# Patient Record
Sex: MALE | Race: WHITE | NOT HISPANIC OR LATINO | Employment: FULL TIME | URBAN - METROPOLITAN AREA
[De-identification: names, ages, dates, MRNs, and addresses within clinical notes are randomized per-mention and may not be internally consistent; named-entity substitution may affect disease eponyms.]

---

## 2018-11-30 ENCOUNTER — HOSPITAL ENCOUNTER (EMERGENCY)
Facility: HOSPITAL | Age: 48
Discharge: HOME | End: 2018-12-01
Attending: EMERGENCY MEDICINE | Admitting: EMERGENCY MEDICINE

## 2018-11-30 DIAGNOSIS — R31.9 URINARY TRACT INFECTION WITH HEMATURIA, SITE UNSPECIFIED: ICD-10-CM

## 2018-11-30 DIAGNOSIS — N39.0 URINARY TRACT INFECTION WITH HEMATURIA, SITE UNSPECIFIED: ICD-10-CM

## 2018-11-30 DIAGNOSIS — N20.0 KIDNEY STONE: Primary | ICD-10-CM

## 2018-11-30 PROCEDURE — 99283 EMERGENCY DEPT VISIT LOW MDM: CPT

## 2018-11-30 PROCEDURE — 85025 COMPLETE CBC W/AUTO DIFF WBC: CPT | Performed by: EMERGENCY MEDICINE

## 2018-11-30 PROCEDURE — 81001 URINALYSIS AUTO W/SCOPE: CPT | Performed by: EMERGENCY MEDICINE

## 2018-11-30 PROCEDURE — 80053 COMPREHEN METABOLIC PANEL: CPT | Performed by: EMERGENCY MEDICINE

## 2018-11-30 RX ORDER — KETOROLAC TROMETHAMINE 30 MG/ML
30 INJECTION, SOLUTION INTRAMUSCULAR; INTRAVENOUS ONCE
Status: COMPLETED | OUTPATIENT
Start: 2018-11-30 | End: 2018-12-01

## 2018-11-30 RX ORDER — SODIUM CHLORIDE 0.9 % (FLUSH) 0.9 %
10 SYRINGE (ML) INJECTION AS NEEDED
Status: DISCONTINUED | OUTPATIENT
Start: 2018-11-30 | End: 2018-12-01 | Stop reason: HOSPADM

## 2018-11-30 RX ORDER — ONDANSETRON 2 MG/ML
4 INJECTION INTRAMUSCULAR; INTRAVENOUS ONCE
Status: COMPLETED | OUTPATIENT
Start: 2018-11-30 | End: 2018-12-01

## 2018-12-01 ENCOUNTER — APPOINTMENT (OUTPATIENT)
Dept: CT IMAGING | Facility: HOSPITAL | Age: 48
End: 2018-12-01

## 2018-12-01 VITALS
WEIGHT: 220.46 LBS | DIASTOLIC BLOOD PRESSURE: 78 MMHG | SYSTOLIC BLOOD PRESSURE: 115 MMHG | RESPIRATION RATE: 16 BRPM | HEIGHT: 76 IN | TEMPERATURE: 97.9 F | BODY MASS INDEX: 26.85 KG/M2 | HEART RATE: 67 BPM | OXYGEN SATURATION: 95 %

## 2018-12-01 LAB
ALBUMIN SERPL-MCNC: 4.7 G/DL (ref 3.5–5.2)
ALBUMIN/GLOB SERPL: 1.8 G/DL
ALP SERPL-CCNC: 44 U/L (ref 40–129)
ALT SERPL W P-5'-P-CCNC: 45 U/L (ref 5–41)
ANION GAP SERPL CALCULATED.3IONS-SCNC: 12.9 MMOL/L
AST SERPL-CCNC: 20 U/L (ref 5–40)
BACTERIA UR QL AUTO: ABNORMAL /HPF
BASOPHILS # BLD AUTO: 0.03 10*3/MM3 (ref 0–0.2)
BASOPHILS NFR BLD AUTO: 0.3 % (ref 0–2)
BILIRUB SERPL-MCNC: 0.5 MG/DL (ref 0.2–1.2)
BILIRUB UR QL STRIP: ABNORMAL
BUN BLD-MCNC: 21 MG/DL (ref 6–20)
BUN/CREAT SERPL: 14.5 (ref 7–25)
CALCIUM SPEC-SCNC: 9.1 MG/DL (ref 8.6–10.5)
CHLORIDE SERPL-SCNC: 103 MMOL/L (ref 98–107)
CLARITY UR: ABNORMAL
CO2 SERPL-SCNC: 24.1 MMOL/L (ref 22–29)
COD CRY URNS QL: ABNORMAL /HPF
COLOR UR: ABNORMAL
CREAT BLD-MCNC: 1.45 MG/DL (ref 0.76–1.27)
DEPRECATED RDW RBC AUTO: 38.7 FL (ref 37–54)
EOSINOPHIL # BLD AUTO: 0.32 10*3/MM3 (ref 0.1–0.3)
EOSINOPHIL NFR BLD AUTO: 2.7 % (ref 0–4)
ERYTHROCYTE [DISTWIDTH] IN BLOOD BY AUTOMATED COUNT: 12 % (ref 11.5–14.5)
GFR SERPL CREATININE-BSD FRML MDRD: 53 ML/MIN/1.73
GLOBULIN UR ELPH-MCNC: 2.6 GM/DL
GLUCOSE BLD-MCNC: 107 MG/DL (ref 65–99)
GLUCOSE UR STRIP-MCNC: ABNORMAL MG/DL
HCT VFR BLD AUTO: 42.6 % (ref 42–52)
HGB BLD-MCNC: 14.9 G/DL (ref 14–18)
HGB UR QL STRIP.AUTO: ABNORMAL
HYALINE CASTS UR QL AUTO: ABNORMAL /LPF
IMM GRANULOCYTES # BLD: 0.02 10*3/MM3 (ref 0–0.03)
IMM GRANULOCYTES NFR BLD: 0.2 % (ref 0–0.5)
KETONES UR QL STRIP: ABNORMAL
LEUKOCYTE ESTERASE UR QL STRIP.AUTO: ABNORMAL
LYMPHOCYTES # BLD AUTO: 1.44 10*3/MM3 (ref 0.6–4.8)
LYMPHOCYTES NFR BLD AUTO: 12.2 % (ref 20–45)
MCH RBC QN AUTO: 30.8 PG (ref 27–31)
MCHC RBC AUTO-ENTMCNC: 35 G/DL (ref 31–37)
MCV RBC AUTO: 88.2 FL (ref 80–94)
MONOCYTES # BLD AUTO: 1.49 10*3/MM3 (ref 0–1)
MONOCYTES NFR BLD AUTO: 12.6 % (ref 3–8)
NEUTROPHILS # BLD AUTO: 8.5 10*3/MM3 (ref 1.5–8.3)
NEUTROPHILS NFR BLD AUTO: 72 % (ref 45–70)
NITRITE UR QL STRIP: POSITIVE
NRBC BLD MANUAL-RTO: 0 /100 WBC (ref 0–0)
PH UR STRIP.AUTO: <=5 [PH] (ref 4.5–8)
PLATELET # BLD AUTO: 230 10*3/MM3 (ref 140–500)
PMV BLD AUTO: 10.1 FL (ref 7.4–10.4)
POTASSIUM BLD-SCNC: 4 MMOL/L (ref 3.5–5.2)
PROT SERPL-MCNC: 7.3 G/DL (ref 6–8.5)
PROT UR QL STRIP: ABNORMAL
RBC # BLD AUTO: 4.83 10*6/MM3 (ref 4.7–6.1)
RBC # UR: ABNORMAL /HPF
REF LAB TEST METHOD: ABNORMAL
SODIUM BLD-SCNC: 140 MMOL/L (ref 136–145)
SP GR UR STRIP: <=1.005 (ref 1–1.03)
SQUAMOUS #/AREA URNS HPF: ABNORMAL /HPF
UROBILINOGEN UR QL STRIP: ABNORMAL
WBC NRBC COR # BLD: 11.8 10*3/MM3 (ref 4.8–10.8)
WBC UR QL AUTO: ABNORMAL /HPF

## 2018-12-01 PROCEDURE — 96375 TX/PRO/DX INJ NEW DRUG ADDON: CPT

## 2018-12-01 PROCEDURE — 99284 EMERGENCY DEPT VISIT MOD MDM: CPT | Performed by: EMERGENCY MEDICINE

## 2018-12-01 PROCEDURE — 74176 CT ABD & PELVIS W/O CONTRAST: CPT

## 2018-12-01 PROCEDURE — 25010000002 KETOROLAC TROMETHAMINE PER 15 MG: Performed by: EMERGENCY MEDICINE

## 2018-12-01 PROCEDURE — 96374 THER/PROPH/DIAG INJ IV PUSH: CPT

## 2018-12-01 PROCEDURE — 25010000002 ONDANSETRON PER 1 MG: Performed by: EMERGENCY MEDICINE

## 2018-12-01 PROCEDURE — 96361 HYDRATE IV INFUSION ADD-ON: CPT

## 2018-12-01 PROCEDURE — 25010000002 MORPHINE PER 10 MG: Performed by: EMERGENCY MEDICINE

## 2018-12-01 RX ORDER — HYDROCODONE BITARTRATE AND ACETAMINOPHEN 5; 325 MG/1; MG/1
1 TABLET ORAL EVERY 8 HOURS PRN
Qty: 15 TABLET | Refills: 0 | Status: SHIPPED | OUTPATIENT
Start: 2018-12-01 | End: 2018-12-06

## 2018-12-01 RX ORDER — CEFUROXIME AXETIL 250 MG/1
500 TABLET ORAL ONCE
Status: COMPLETED | OUTPATIENT
Start: 2018-12-01 | End: 2018-12-01

## 2018-12-01 RX ORDER — TAMSULOSIN HYDROCHLORIDE 0.4 MG/1
1 CAPSULE ORAL DAILY
Qty: 7 CAPSULE | Refills: 0 | Status: SHIPPED | OUTPATIENT
Start: 2018-12-01 | End: 2018-12-08

## 2018-12-01 RX ORDER — HYDROCODONE BITARTRATE AND ACETAMINOPHEN 5; 325 MG/1; MG/1
1 TABLET ORAL ONCE
Status: DISCONTINUED | OUTPATIENT
Start: 2018-12-01 | End: 2018-12-01 | Stop reason: HOSPADM

## 2018-12-01 RX ORDER — CEFUROXIME AXETIL 500 MG/1
500 TABLET ORAL 2 TIMES DAILY
Qty: 14 TABLET | Refills: 0 | Status: SHIPPED | OUTPATIENT
Start: 2018-12-01 | End: 2018-12-08

## 2018-12-01 RX ORDER — TAMSULOSIN HYDROCHLORIDE 0.4 MG/1
0.4 CAPSULE ORAL ONCE
Status: COMPLETED | OUTPATIENT
Start: 2018-12-01 | End: 2018-12-01

## 2018-12-01 RX ORDER — ONDANSETRON 4 MG/1
4 TABLET, FILM COATED ORAL EVERY 8 HOURS PRN
Qty: 20 TABLET | Refills: 0 | Status: SHIPPED | OUTPATIENT
Start: 2018-12-01 | End: 2018-12-08

## 2018-12-01 RX ADMIN — KETOROLAC TROMETHAMINE 30 MG: 30 INJECTION, SOLUTION INTRAMUSCULAR at 00:20

## 2018-12-01 RX ADMIN — ONDANSETRON 4 MG: 2 INJECTION, SOLUTION INTRAMUSCULAR; INTRAVENOUS at 00:19

## 2018-12-01 RX ADMIN — MORPHINE SULFATE 4 MG: 4 INJECTION INTRAVENOUS at 00:21

## 2018-12-01 RX ADMIN — TAMSULOSIN HYDROCHLORIDE 0.4 MG: 0.4 CAPSULE ORAL at 01:17

## 2018-12-01 RX ADMIN — SODIUM CHLORIDE 1000 ML: 9 INJECTION, SOLUTION INTRAVENOUS at 00:19

## 2018-12-01 RX ADMIN — CEFUROXIME AXETIL 500 MG: 250 TABLET ORAL at 01:17

## 2018-12-01 NOTE — DISCHARGE INSTRUCTIONS
Drink plenty of water as tolerated.  Please return to the emergency room for any worsening pain, fevers, nausea, vomiting, difficulties urinating or any other concerns.

## 2018-12-01 NOTE — ED PROVIDER NOTES
Subjective   History of Present Illness  History of Present Illness    Chief complaint: flank pain, dysruia    Location: left flank, radiating into the left abdomen    Quality/Severity:  Moderate sharp pains    Timing/Duration: ongoing for the past 2-3 days    Modifying Factors: none    Narrative: This pt presents for evaluation of new onset left sided flank pain for the past 3 days.  He says the pain started in the left flank but has moved into the left side and LLQ over the past couple days.  He has had some urinary urgency and is taking AZO tabs.  He has had some nausea but no vomiting.  This episode feels like previous kidney stone episodes he's had in the past.  He took some ibuprofen for the pain, but it is not helping much tonight.     Associated Symptoms: as above  Review of Systems   Constitutional: Negative for activity change and fever.   HENT: Negative.    Respiratory: Negative for cough and shortness of breath.    Cardiovascular: Negative for chest pain.   Gastrointestinal: Positive for abdominal pain and nausea. Negative for blood in stool, constipation, diarrhea and vomiting.   Genitourinary: Positive for flank pain, frequency, hematuria and urgency. Negative for difficulty urinating and dysuria.   Skin: Negative for color change and rash.   Neurological: Negative for syncope and headaches.   All other systems reviewed and are negative.      No past medical history on file.    No Known Allergies    No past surgical history on file.    No family history on file.    Social History     Socioeconomic History   • Marital status:      Spouse name: Not on file   • Number of children: Not on file   • Years of education: Not on file   • Highest education level: Not on file       ED Triage Vitals [11/30/18 2344]   Temp Heart Rate Resp BP SpO2   97.9 °F (36.6 °C) 72 16 159/92 98 %      Temp src Heart Rate Source Patient Position BP Location FiO2 (%)   Oral Monitor Lying Right arm --         Objective    Physical Exam   Constitutional: He is oriented to person, place, and time. He appears well-developed and well-nourished.   HENT:   Head: Normocephalic and atraumatic.   Eyes: EOM are normal. Pupils are equal, round, and reactive to light. Right eye exhibits no discharge. Left eye exhibits no discharge.   Neck: Normal range of motion. Neck supple. No tracheal deviation present.   Cardiovascular: Normal rate, regular rhythm and intact distal pulses. Exam reveals no friction rub.   No murmur heard.  Pulmonary/Chest: Effort normal. No stridor. No respiratory distress. He has no wheezes. He has no rales.   Abdominal: Soft. He exhibits no distension and no mass. There is tenderness (mild LLQ only.  No peritoneal signs). There is no rebound and no guarding.   Mild to mod L CVA ttp   Musculoskeletal: Normal range of motion. He exhibits no edema or deformity.   Neurological: He is alert and oriented to person, place, and time. He exhibits normal muscle tone.   Skin: Skin is warm and dry. No rash noted. No erythema. No pallor.   Psychiatric: He has a normal mood and affect. His behavior is normal. Judgment and thought content normal.   Nursing note and vitals reviewed.      Results for orders placed or performed during the hospital encounter of 11/30/18   Comprehensive Metabolic Panel   Result Value Ref Range    Glucose 107 (H) 65 - 99 mg/dL    BUN 21 (H) 6 - 20 mg/dL    Creatinine 1.45 (H) 0.76 - 1.27 mg/dL    Sodium 140 136 - 145 mmol/L    Potassium 4.0 3.5 - 5.2 mmol/L    Chloride 103 98 - 107 mmol/L    CO2 24.1 22.0 - 29.0 mmol/L    Calcium 9.1 8.6 - 10.5 mg/dL    Total Protein 7.3 6.0 - 8.5 g/dL    Albumin 4.70 3.50 - 5.20 g/dL    ALT (SGPT) 45 (H) 5 - 41 U/L    AST (SGOT) 20 5 - 40 U/L    Alkaline Phosphatase 44 40 - 129 U/L    Total Bilirubin 0.5 0.2 - 1.2 mg/dL    eGFR Non African Amer 53 (L) >60 mL/min/1.73    Globulin 2.6 gm/dL    A/G Ratio 1.8 g/dL    BUN/Creatinine Ratio 14.5 7.0 - 25.0    Anion Gap 12.9 mmol/L    Urinalysis With Culture If Indicated - Urine, Clean Catch   Result Value Ref Range    Color, UA Red (A) Yellow, Straw    Appearance, UA Cloudy (A) Clear    pH, UA <=5.0 4.5 - 8.0    Specific Gravity, UA <=1.005 1.003 - 1.030    Glucose,  mg/dL (1+) (A) Negative    Ketones, UA 15 mg/dL (1+) (A) Negative, 80 mg/dL (3+), >=160 mg/dL (4+)    Bilirubin, UA Moderate (2+) (A) Negative    Blood, UA Small (1+) (A) Negative    Protein,  mg/dL (2+) (A) Negative    Leuk Esterase, UA Large (3+) (A) Negative    Nitrite, UA Positive (A) Negative    Urobilinogen, UA 4.0 E.U./dL (A) 0.2 - 1.0 E.U./dL   CBC Auto Differential   Result Value Ref Range    WBC 11.80 (H) 4.80 - 10.80 10*3/mm3    RBC 4.83 4.70 - 6.10 10*6/mm3    Hemoglobin 14.9 14.0 - 18.0 g/dL    Hematocrit 42.6 42.0 - 52.0 %    MCV 88.2 80.0 - 94.0 fL    MCH 30.8 27.0 - 31.0 pg    MCHC 35.0 31.0 - 37.0 g/dL    RDW 12.0 11.5 - 14.5 %    RDW-SD 38.7 37.0 - 54.0 fl    MPV 10.1 7.4 - 10.4 fL    Platelets 230 140 - 500 10*3/mm3    Neutrophil % 72.0 (H) 45.0 - 70.0 %    Lymphocyte % 12.2 (L) 20.0 - 45.0 %    Monocyte % 12.6 (H) 3.0 - 8.0 %    Eosinophil % 2.7 0.0 - 4.0 %    Basophil % 0.3 0.0 - 2.0 %    Immature Grans % 0.2 0.0 - 0.5 %    Neutrophils, Absolute 8.50 (H) 1.50 - 8.30 10*3/mm3    Lymphocytes, Absolute 1.44 0.60 - 4.80 10*3/mm3    Monocytes, Absolute 1.49 (H) 0.00 - 1.00 10*3/mm3    Eosinophils, Absolute 0.32 (H) 0.10 - 0.30 10*3/mm3    Basophils, Absolute 0.03 0.00 - 0.20 10*3/mm3    Immature Grans, Absolute 0.02 0.00 - 0.03 10*3/mm3    nRBC 0.0 0.0 - 0.0 /100 WBC   Urinalysis, Microscopic Only - Urine, Clean Catch   Result Value Ref Range    RBC, UA 6-12 (A) None Seen /HPF    WBC, UA 0-2 (A) None Seen /HPF    Bacteria, UA None Seen None Seen /HPF    Squamous Epithelial Cells, UA 0-2 None Seen, 0-2 /HPF    Hyaline Casts, UA None Seen None Seen /LPF    Calcium Oxalate Crystals, UA Small/1+ None Seen /HPF    Methodology Manual Light Microscopy         RADIOLOGY        Study: CT abdomen and pelvis without contrast    Findings: 4.5 mm stone in the distal left ureter just proximal to the left UVJ, producing mild to moderate Hydro and perinephric stranding.  Grade 1 anteriolisthesis of L5 on S1 secondary to bilateral L5 pars defects    Interpreted contemporaneously with treatment by Dr. Patterson, independently viewed by me          Procedures           ED Course  ED Course as of Dec 01 0207   Sat Dec 01, 2018   0206 I have reviewed the labs and CT scanner report.  Patient is found to have a 4.5 mm kidney stone.  He is feeling better after some IV morphine and Toradol combination.  There are nitrites in his urine so I will go ahead and start him on Ceftin therapy with a prescription for Ceftin ×7 days.  Advised him to contact the urology office next week.  Continued symptomatic management at home with hydrocodone and Flomax and Zofran as needed.  [KELSEY]      ED Course User Index  [KELSEY] David Burnette MD                  MDM  Number of Diagnoses or Management Options  Kidney stone:   Urinary tract infection with hematuria, site unspecified:      Amount and/or Complexity of Data Reviewed  Clinical lab tests: reviewed and ordered  Tests in the radiology section of CPT®: reviewed and ordered  Independent visualization of images, tracings, or specimens: yes    Risk of Complications, Morbidity, and/or Mortality  Presenting problems: moderate  Diagnostic procedures: moderate  Management options: moderate          Final diagnoses:   Kidney stone   Urinary tract infection with hematuria, site unspecified            David Burnette MD  12/01/18 020

## 2021-01-19 ENCOUNTER — OFFICE VISIT (OUTPATIENT)
Dept: INTERNAL MEDICINE | Facility: CLINIC | Age: 51
End: 2021-01-19

## 2021-01-19 VITALS
OXYGEN SATURATION: 98 % | DIASTOLIC BLOOD PRESSURE: 76 MMHG | HEART RATE: 65 BPM | SYSTOLIC BLOOD PRESSURE: 122 MMHG | BODY MASS INDEX: 26.69 KG/M2 | HEIGHT: 76 IN | TEMPERATURE: 97.1 F | WEIGHT: 219.2 LBS

## 2021-01-19 DIAGNOSIS — R06.2 WHEEZING: ICD-10-CM

## 2021-01-19 DIAGNOSIS — Z00.00 ANNUAL PHYSICAL EXAM: Primary | ICD-10-CM

## 2021-01-19 DIAGNOSIS — F90.9 ADULT ADHD: ICD-10-CM

## 2021-01-19 DIAGNOSIS — Z12.11 SCREENING FOR MALIGNANT NEOPLASM OF COLON: ICD-10-CM

## 2021-01-19 DIAGNOSIS — Z86.39 HISTORY OF HYPERLIPIDEMIA: ICD-10-CM

## 2021-01-19 PROBLEM — S43.492A BANKART LESION OF LEFT SHOULDER: Status: ACTIVE | Noted: 2019-02-20

## 2021-01-19 PROBLEM — S43.432A TEAR OF LEFT GLENOID LABRUM: Status: RESOLVED | Noted: 2019-02-20 | Resolved: 2021-01-19

## 2021-01-19 PROBLEM — S43.432A BANKART LESION OF LEFT SHOULDER: Status: ACTIVE | Noted: 2019-02-20

## 2021-01-19 PROBLEM — S43.492A BANKART LESION OF LEFT SHOULDER: Status: RESOLVED | Noted: 2019-02-20 | Resolved: 2021-01-19

## 2021-01-19 PROBLEM — S43.432A BANKART LESION OF LEFT SHOULDER: Status: RESOLVED | Noted: 2019-02-20 | Resolved: 2021-01-19

## 2021-01-19 PROBLEM — S43.432A TEAR OF LEFT GLENOID LABRUM: Status: ACTIVE | Noted: 2019-02-20

## 2021-01-19 PROBLEM — Z98.890 STATUS POST LABRAL REPAIR OF SHOULDER: Status: ACTIVE | Noted: 2019-03-13

## 2021-01-19 PROCEDURE — 99213 OFFICE O/P EST LOW 20 MIN: CPT | Performed by: FAMILY MEDICINE

## 2021-01-19 PROCEDURE — 99386 PREV VISIT NEW AGE 40-64: CPT | Performed by: FAMILY MEDICINE

## 2021-01-19 RX ORDER — DEXTROAMPHETAMINE SACCHARATE, AMPHETAMINE ASPARTATE, DEXTROAMPHETAMINE SULFATE AND AMPHETAMINE SULFATE 2.5; 2.5; 2.5; 2.5 MG/1; MG/1; MG/1; MG/1
10 TABLET ORAL DAILY
COMMUNITY
End: 2021-01-19

## 2021-01-19 RX ORDER — ALBUTEROL SULFATE 90 UG/1
2 AEROSOL, METERED RESPIRATORY (INHALATION) EVERY 4 HOURS PRN
Qty: 6.7 G | Refills: 0 | Status: SHIPPED | OUTPATIENT
Start: 2021-01-19 | End: 2021-03-22 | Stop reason: SDUPTHER

## 2021-01-19 RX ORDER — DEXTROAMPHETAMINE SACCHARATE, AMPHETAMINE ASPARTATE MONOHYDRATE, DEXTROAMPHETAMINE SULFATE AND AMPHETAMINE SULFATE 2.5; 2.5; 2.5; 2.5 MG/1; MG/1; MG/1; MG/1
10 CAPSULE, EXTENDED RELEASE ORAL EVERY MORNING
Qty: 30 CAPSULE | Refills: 0 | Status: SHIPPED | OUTPATIENT
Start: 2021-01-19 | End: 2021-07-20

## 2021-01-19 NOTE — PROGRESS NOTES
Date of Encounter: 2021  Patient: Alphonso Myers,  1970    Subjective   History of Presenting Illness  Chief complaint: Annual physical    ADHD: Diagnosed as an adult after having symptoms of concentration at work.  Previously prescribed Adderall XR 10 mg and tolerated well with only mild upset stomach when he first takes the dose.  Last prescribed in 2017 #30 tabs, he brought the prescription bottle which still has some tabs in it.  Due to recent work stressors and long work days he feels he would benefit from restarting this.    Wheezing: Has bronchitis about once a year, needs an albuterol inhaler at this time.  Has never been diagnosed with asthma.  Denies shortness of breath.  Minimal allergy symptoms since moving here.    History of hyperlipidemia, was on statin, but subsequent testing showed that he did not have high cholesterol.    Status post labral repair left shoulder    Lives with wife, 2 children ages 14 and 11.  Never smoker.  3 alcoholic drinks per week.  Exercises on the [employer] with light weightlifting 3 times weekly.  Works in sales making packaging equipment.  Wife is a .  Does not have a living will.  Declines influenza vaccination.  Tetanus vaccination 6 years ago.  Family history of colon cancer in maternal grandfather, polyps in parents    Review of Systems:  Negative for fever, congestion, chest pain upon exertion, shortness of breath, vision changes, vomiting, dysuria, lymphadenopathy, muscle weakness, numbness, mood changes, rashes.    The following portions of the patient's history were reviewed and updated as appropriate: allergies, current medications, past family history, past medical history, past social history, past surgical history and problem list.    Patient Active Problem List   Diagnosis   • Status post labral repair of shoulder   • Adult ADHD   • Wheezing   • History of hyperlipidemia     Past Medical History:   Diagnosis Date   • ADHD  "(attention deficit hyperactivity disorder)    • Bankart lesion of left shoulder    • Dislocation of left shoulder joint    • Status post labral repair of shoulder    • Tear of left glenoid labrum      Past Surgical History:   Procedure Laterality Date   • SHOULDER ARTHROSCOPY Left 02/26/2019    Left shoulder arthroscopy with extensive labral repair including capsulorrhaphy, reduction and fixation glenoid fracture, SLAP repair as well as chondroplasty       No family history on file.    Current Outpatient Medications:   •  albuterol sulfate  (90 Base) MCG/ACT inhaler, Inhale 2 puffs Every 4 (Four) Hours As Needed for Wheezing., Disp: 6.7 g, Rfl: 0  •  amphetamine-dextroamphetamine XR (ADDERALL XR) 10 MG 24 hr capsule, Take 1 capsule by mouth Every Morning, Disp: 30 capsule, Rfl: 0  No Known Allergies  Social History     Tobacco Use   • Smoking status: Never Smoker   • Smokeless tobacco: Never Used   Substance Use Topics   • Alcohol use: Yes   • Drug use: Not on file          Objective   Physical Exam  Vitals:    01/19/21 1259   BP: 122/76   Pulse: 65   Temp: 97.1 °F (36.2 °C)   TempSrc: Temporal   SpO2: 98%   Weight: 99.4 kg (219 lb 3.2 oz)   Height: 193 cm (76\")     Body mass index is 26.68 kg/m².    Constitutional: NAD.  Eyes: EOMI. PERRLA. Normal conjunctiva.  Ear, nose, mouth, throat: No tonsillar exudates or erythema.   Normal nasal mucosa. Normal external ear canals and TMs bilaterally.  Cardiovascular: RRR. No murmurs. No LE edema b/l. Radial pulses 2+ bilaterally.  Pulmonary: Mild, intermittent, high-pitched inspiratory and expiratory bibasilar wheezing that does not resolve with coughing.  Not present in the rest of the lung fields.  Good effort.  Integumentary: No rashes or wounds on face or upper extremities.  Lymphatic: No anterior cervical lymphadenopathy.  Endocrine: No thyromegaly or palpable thyroid nodules.  Psychiatric: Normal affect. Normal thought content.  Gastrointestinal: Nondistended. " No hepatosplenomegaly. No focal tenderness to palpation. Normal bowel sounds.       Assessment/Plan   Assessment and Plan  Pleasant 42-year-old male with ADHD, history of hyperlipidemia, mild wheezing, who presents for the following:    Diagnoses and all orders for this visit:    1. Annual physical exam (Primary):We discussed preventative care including age and patient-appropriate immunizations and cancer screening. We also discussed the importance of exercise and a healthy diet. This is their annual preventative exam.    2. Adult ADHD: Discussed risks and benefits of this medication at his age.  It appears he tolerated well and the dose is low.  We will resume medication.  David reviewed, UDS to be collected with labs fasting, contract signed.  -     amphetamine-dextroamphetamine XR (ADDERALL XR) 10 MG 24 hr capsule; Take 1 capsule by mouth Every Morning  Dispense: 30 capsule; Refill: 0  -     ToxASSURE Select 13 (MW) - Urine, Clean Catch; Future    3. Wheezing: May have a component of reactive airway disease.  Findings are nonfocal and he has no symptoms.  Recommend he try albuterol inhaler before exercise and see if he notes functional improvement.  We will recheck at next examination.  -     albuterol sulfate  (90 Base) MCG/ACT inhaler; Inhale 2 puffs Every 4 (Four) Hours As Needed for Wheezing.  Dispense: 6.7 g; Refill: 0    4. History of hyperlipidemia  -     Lipid Panel; Future  -     Comprehensive Metabolic Panel; Future  -     Thyroid Panel With TSH; Future    5. Screening for malignant neoplasm of colon  -     Ambulatory Referral For Screening Colonoscopy    Follow-up in 6 months for ADHD, wheezing    Don Blackmon MD  Family Medicine  O: [phone]  C: [phone]    Disclaimer: Parts of this note were dictated by speech recognition. Minor errors in transcription may be present. Please call if questions.

## 2021-01-22 ENCOUNTER — LAB (OUTPATIENT)
Dept: LAB | Facility: HOSPITAL | Age: 51
End: 2021-01-22

## 2021-01-22 ENCOUNTER — TELEPHONE (OUTPATIENT)
Dept: INTERNAL MEDICINE | Facility: CLINIC | Age: 51
End: 2021-01-22

## 2021-01-22 DIAGNOSIS — F90.9 ADULT ADHD: ICD-10-CM

## 2021-01-22 DIAGNOSIS — Z86.39 HISTORY OF HYPERLIPIDEMIA: ICD-10-CM

## 2021-01-22 PROBLEM — E78.5 HYPERLIPIDEMIA: Status: ACTIVE | Noted: 2021-01-19

## 2021-01-22 LAB
ALBUMIN SERPL-MCNC: 4.6 G/DL (ref 3.5–5.2)
ALBUMIN/GLOB SERPL: 1.8 G/DL
ALP SERPL-CCNC: 48 U/L (ref 39–117)
ALT SERPL W P-5'-P-CCNC: 27 U/L (ref 1–41)
ANION GAP SERPL CALCULATED.3IONS-SCNC: 7.6 MMOL/L (ref 5–15)
AST SERPL-CCNC: 14 U/L (ref 1–40)
BILIRUB SERPL-MCNC: 0.4 MG/DL (ref 0–1.2)
BUN SERPL-MCNC: 16 MG/DL (ref 6–20)
BUN/CREAT SERPL: 15 (ref 7–25)
CALCIUM SPEC-SCNC: 9.1 MG/DL (ref 8.6–10.5)
CHLORIDE SERPL-SCNC: 108 MMOL/L (ref 98–107)
CHOLEST SERPL-MCNC: 182 MG/DL (ref 0–200)
CO2 SERPL-SCNC: 26.4 MMOL/L (ref 22–29)
CREAT SERPL-MCNC: 1.07 MG/DL (ref 0.76–1.27)
GFR SERPL CREATININE-BSD FRML MDRD: 74 ML/MIN/1.73
GLOBULIN UR ELPH-MCNC: 2.5 GM/DL
GLUCOSE SERPL-MCNC: 94 MG/DL (ref 65–99)
HDLC SERPL-MCNC: 44 MG/DL (ref 40–60)
LDLC SERPL CALC-MCNC: 122 MG/DL (ref 0–100)
LDLC/HDLC SERPL: 2.74 {RATIO}
POTASSIUM SERPL-SCNC: 4 MMOL/L (ref 3.5–5.2)
PROT SERPL-MCNC: 7.1 G/DL (ref 6–8.5)
SODIUM SERPL-SCNC: 142 MMOL/L (ref 136–145)
T-UPTAKE NFR SERPL: 1.05 TBI (ref 0.8–1.3)
T4 SERPL-MCNC: 5.63 MCG/DL (ref 4.5–11.7)
TRIGL SERPL-MCNC: 88 MG/DL (ref 0–150)
TSH SERPL DL<=0.05 MIU/L-ACNC: 1.72 UIU/ML (ref 0.27–4.2)
VLDLC SERPL-MCNC: 16 MG/DL (ref 5–40)

## 2021-01-22 PROCEDURE — 80061 LIPID PANEL: CPT

## 2021-01-22 PROCEDURE — 36415 COLL VENOUS BLD VENIPUNCTURE: CPT

## 2021-01-22 PROCEDURE — 80307 DRUG TEST PRSMV CHEM ANLYZR: CPT

## 2021-01-22 PROCEDURE — 84443 ASSAY THYROID STIM HORMONE: CPT

## 2021-01-22 PROCEDURE — 84436 ASSAY OF TOTAL THYROXINE: CPT

## 2021-01-22 PROCEDURE — G0481 DRUG TEST DEF 8-14 CLASSES: HCPCS

## 2021-01-22 PROCEDURE — 80053 COMPREHEN METABOLIC PANEL: CPT

## 2021-01-22 PROCEDURE — 84479 ASSAY OF THYROID (T3 OR T4): CPT

## 2021-01-27 LAB — DRUGS UR: NORMAL

## 2021-02-18 ENCOUNTER — PREP FOR SURGERY (OUTPATIENT)
Dept: SURGERY | Facility: SURGERY CENTER | Age: 51
End: 2021-02-18

## 2021-02-18 DIAGNOSIS — Z80.0 FAMILY HISTORY OF COLON CANCER: Primary | ICD-10-CM

## 2021-02-18 DIAGNOSIS — Z83.71 FAMILY HISTORY OF COLONIC POLYPS: ICD-10-CM

## 2021-02-18 RX ORDER — SODIUM CHLORIDE, SODIUM LACTATE, POTASSIUM CHLORIDE, CALCIUM CHLORIDE 600; 310; 30; 20 MG/100ML; MG/100ML; MG/100ML; MG/100ML
30 INJECTION, SOLUTION INTRAVENOUS CONTINUOUS PRN
Status: CANCELLED | OUTPATIENT
Start: 2021-02-18

## 2021-02-18 RX ORDER — SODIUM CHLORIDE 0.9 % (FLUSH) 0.9 %
3 SYRINGE (ML) INJECTION EVERY 12 HOURS SCHEDULED
Status: CANCELLED | OUTPATIENT
Start: 2021-02-18

## 2021-02-18 RX ORDER — SODIUM CHLORIDE 0.9 % (FLUSH) 0.9 %
10 SYRINGE (ML) INJECTION AS NEEDED
Status: CANCELLED | OUTPATIENT
Start: 2021-02-18

## 2021-03-08 ENCOUNTER — LAB REQUISITION (OUTPATIENT)
Dept: LAB | Facility: HOSPITAL | Age: 51
End: 2021-03-08

## 2021-03-08 DIAGNOSIS — Z00.00 ENCOUNTER FOR GENERAL ADULT MEDICAL EXAMINATION WITHOUT ABNORMAL FINDINGS: ICD-10-CM

## 2021-03-08 LAB — SARS-COV-2 ORF1AB RESP QL NAA+PROBE: NOT DETECTED

## 2021-03-08 PROCEDURE — U0004 COV-19 TEST NON-CDC HGH THRU: HCPCS | Performed by: INTERNAL MEDICINE

## 2021-03-12 ENCOUNTER — OUTSIDE FACILITY SERVICE (OUTPATIENT)
Dept: GASTROENTEROLOGY | Facility: CLINIC | Age: 51
End: 2021-03-12

## 2021-03-12 PROCEDURE — 45385 COLONOSCOPY W/LESION REMOVAL: CPT | Performed by: INTERNAL MEDICINE

## 2021-03-22 DIAGNOSIS — R06.2 WHEEZING: ICD-10-CM

## 2021-03-22 RX ORDER — ALBUTEROL SULFATE 90 UG/1
2 AEROSOL, METERED RESPIRATORY (INHALATION) EVERY 4 HOURS PRN
Qty: 6.7 G | Refills: 3 | Status: SHIPPED | OUTPATIENT
Start: 2021-03-22 | End: 2021-10-22 | Stop reason: SDUPTHER

## 2021-07-20 ENCOUNTER — OFFICE VISIT (OUTPATIENT)
Dept: INTERNAL MEDICINE | Facility: CLINIC | Age: 51
End: 2021-07-20

## 2021-07-20 VITALS
HEIGHT: 76 IN | BODY MASS INDEX: 26.67 KG/M2 | SYSTOLIC BLOOD PRESSURE: 114 MMHG | WEIGHT: 219 LBS | OXYGEN SATURATION: 97 % | HEART RATE: 59 BPM | DIASTOLIC BLOOD PRESSURE: 78 MMHG | TEMPERATURE: 97.3 F

## 2021-07-20 DIAGNOSIS — F90.9 ADULT ADHD: ICD-10-CM

## 2021-07-20 DIAGNOSIS — R06.2 WHEEZING: Primary | ICD-10-CM

## 2021-07-20 PROCEDURE — 99213 OFFICE O/P EST LOW 20 MIN: CPT | Performed by: FAMILY MEDICINE

## 2021-07-20 NOTE — PROGRESS NOTES
Date of Encounter: 2021  Patient: Alphonso Myers,  1970    Subjective   History of Presenting Illness  Chief complaint: Follow-up wheezing    Wheezing: Patient has noticed wheezing primarily during colder weather and dry air months, having to use his albuterol inhaler up to 2 times per day as needed.  Does not have frequent nighttime awakenings.  No recent episodes of bronchitis requiring treatment.  During the summer he feels the symptoms are fairly mild and only has to use his albuterol handful of times per week.  No previous diagnosis of asthma.  He does have a history of recurrent bronchitis but does not had an episode in the past few years.  He does have a fairly recent history of chest x-ray several years ago that did not show any significant findings.    Adult ADHD: Tried up to 20 mg of Adderall XR with no significant benefit.  His job has returned to travel and he feels his ADHD symptoms are much less, wants to wait on medications for now.    Lives with wife, 2 children ages 14 and 11.  Never smoker.  3 alcoholic drinks per week.    He has maintained regular exercises on the Peloton with light weightlifting 3-5 times weekly.  Works in sales making packaging equipment.  Wife is a .  Does not have a living will.  Family history of colon cancer in maternal grandfather, polyps in parents    Review of Systems:  Negative for fever, chills, chest pain    The following portions of the patient's history were reviewed and updated as appropriate: allergies, current medications, past family history, past medical history, past social history, past surgical history and problem list.    Patient Active Problem List   Diagnosis   • Status post labral repair of shoulder, left   • Adult ADHD   • Wheezing   • Hyperlipidemia     Past Medical History:   Diagnosis Date   • ADHD (attention deficit hyperactivity disorder)    • Bankart lesion of left shoulder    • Dislocation of left shoulder joint   "  • Status post labral repair of shoulder    • Tear of left glenoid labrum      Past Surgical History:   Procedure Laterality Date   • SHOULDER ARTHROSCOPY Left 02/26/2019    Left shoulder arthroscopy with extensive labral repair including capsulorrhaphy, reduction and fixation glenoid fracture, SLAP repair as well as chondroplasty       History reviewed. No pertinent family history.    Current Outpatient Medications:   •  albuterol sulfate  (90 Base) MCG/ACT inhaler, Inhale 2 puffs Every 4 (Four) Hours As Needed for Wheezing., Disp: 6.7 g, Rfl: 3  •  amphetamine-dextroamphetamine XR (ADDERALL XR) 10 MG 24 hr capsule, Take 1 capsule by mouth Every Morning, Disp: 30 capsule, Rfl: 0  No Known Allergies  Social History     Tobacco Use   • Smoking status: Never Smoker   • Smokeless tobacco: Never Used   Substance Use Topics   • Alcohol use: Yes   • Drug use: Not on file          Objective   Physical Exam  Vitals:    07/20/21 0815   BP: 114/78   Pulse: 59   Temp: 97.3 °F (36.3 °C)   TempSrc: Temporal   SpO2: 97%   Weight: 99.3 kg (219 lb)   Height: 193 cm (76\")     Body mass index is 26.66 kg/m².    Constitutional: NAD.  Eyes: EOMI. PERRLA. Normal conjunctiva.  Ear, nose, mouth, throat: Postnasal drip.  No tonsillar exudates or erythema.   Normal nasal mucosa. Normal external ear canals and TMs bilaterally.  Cardiovascular: RRR. No murmurs. No LE edema b/l. Radial pulses 2+ bilaterally.  Pulmonary: End expiratory wheezing bilaterally, does not clear with cough, no focal findings, good effort.  Integumentary: No rashes or wounds on face or upper extremities.  Lymphatic: No anterior cervical lymphadenopathy.  Endocrine: No thyromegaly or palpable thyroid nodules.  Psychiatric: Normal affect. Normal thought content.     Assessment/Plan   Assessment and Plan  Pleasant 50-year-old male with wheezing, adult ADHD, mild hyperlipidemia, who presents for the following:    Diagnoses and all orders for this visit:    1. " Wheezing (Primary): Subclinical wheezing on exam.  He may not be aware of the extent of his symptoms.  Likely diagnosis is mild asthma, potentially allergic asthma.  Will obtain PFTs for further clarification of diagnosis and severity of disease.  For now based on his inhaler frequency no need for escalation in therapy.  Continue to monitor.  -     Full Pulmonary Function Test With Bronchodilator; Future    2. Adult ADHD: Change in job requirements have reduced the symptoms, continue to monitor.  Would use methylphenidate variety in the future as he did not have significant benefit with amphetamine formulations.    Follow-up in 6 months for annual physical    Don Blackmon MD  Family Medicine  O: [phone]  C: [phone]    Disclaimer: Parts of this note were dictated by speech recognition. Minor errors in transcription may be present. Please call if questions.

## 2021-10-22 DIAGNOSIS — R06.2 WHEEZING: ICD-10-CM

## 2021-10-22 RX ORDER — ALBUTEROL SULFATE 90 UG/1
2 AEROSOL, METERED RESPIRATORY (INHALATION) EVERY 4 HOURS PRN
Qty: 6.7 G | Refills: 3 | Status: SHIPPED | OUTPATIENT
Start: 2021-10-22 | End: 2022-01-22 | Stop reason: SDUPTHER

## 2021-12-02 ENCOUNTER — E-VISIT (OUTPATIENT)
Dept: FAMILY MEDICINE CLINIC | Facility: TELEHEALTH | Age: 51
End: 2021-12-02

## 2021-12-02 DIAGNOSIS — J20.9 ACUTE BRONCHITIS, UNSPECIFIED ORGANISM: Primary | ICD-10-CM

## 2021-12-02 PROCEDURE — BRIGHTMDVISIT: Performed by: NURSE PRACTITIONER

## 2021-12-02 NOTE — EXTERNAL PATIENT INSTRUCTIONS
Note   You may feel you have not been exposed to COVID-19, but you are having mild symptoms. Many of the symptoms you get around allergy, cold and flu season may be mistaken for allergy or sinus symptoms, but for some These are the only symptoms they have with their COVID-19 illness. Testing will be the only way to know. Symptoms may worsen over the next several days. I have included a COVID-19 test. Go to your nearest [facility] for testing. Tell them you have already been seen virtually and only need testing We will notify you of your COVID-19 results by phone. You will receive a call from an unknown or blocked number. You can also get your results on your [facility] el[facility]. You will need to remain quarantined for 10 days from onset of symptoms and only to discontinue if symptoms have improved and no fever for 24 hours without the use of fever reducing medications such as Tylenol (acetaminophen), Advil (ibuprfen), or Aleve (naproxen). Continue to wear a mask for 14 days or until symptoms resolve. If symptoms worsen, go to Urgent Care or Emergency Department for care. Symptoms of Coronavirus are treated just as you would for any viral illness. Take Tylenol and/or Ibuprofen for pain and/or fever, you may find it better to alternate these every 4 hours, so that you are only taking each every 8 hours. Stay hydrated, rest and you may treat cough with over-the-counter cough syrup such as Robitussin. If your symptoms do not improve, symptoms change or do not fully resolve, seek further evaluation with your primary care provider or Urgent Care. If you develop severe symptoms, such as chest pain, high fever, difficulty breathing, difficulty urinating or have any symptoms that interfere with your ability to function go to the nearest Emergency Department immediately.   Diagnosis   Acute bronchitis (chest cold)   My name is Aminah Escudero, and I'm a healthcare provider at [facility]. I'm sorry to hear  that you're not feeling well. Based on your responses, I see that you have a chest cold, also known as acute bronchitis.   To prevent the spread of illness to others, I recommend that you stay home and away from other people as much as possible while you're sick.   Here are the main things to know about your current symptoms:    Acute bronchitis gets better on its own within 1 to 3 weeks.    The medications I recommend here will help ease your symptoms.   Based on what you told me in your interview, I haven't prescribed any antibiotics. Antibiotics fight bacteria, not viruses. Your bronchitis is caused by a virus, so antibiotics won't help. They could even make you feel worse as they can cause or worsen nausea, diarrhea, and stomach pain. The following factors suggest that a virus is causing your symptoms:    You've had symptoms for less than 10 days. A bacterial infection is suspected when you've had symptoms longer than 10 days without improvement.    You don't have sinus pain. Bacterial sinusitis causes sinus pain or pressure.    Your nasal discharge is thin.    Your nasal discharge is clear or white.    In addition to your sore throat, you have other cold symptoms like a stuffy nose or cough. This suggests a viral infection, rather than a bacterial infection such as strep throat.   Medications   Your pharmacy   [id number] [place] [id number] [phone]     Prescription      Albuterol sulfate HFA (90mcg/puff): Inhale 2 puffs every 6 hours as needed for wheezing. If symptoms are severe, may use as often as every 4 hours.      Benzonatate (200mg): Take 1 capsule by mouth 3 times a day as needed for cough. Do not chew or cut these capsules.   About your diagnosis   The bronchial tubes carry air to the lungs. Acute bronchitis happens when these tubes become irritated and inflamed. Smoke and air pollution can cause acute bronchitis, but it's usually caused by a virus. The  viruses that cause the common cold or the flu can also cause bronchitis.   Common symptoms of acute bronchitis include coughing up mucus or phlegm, wheezing, and chest tightness. Cold symptoms along with fatigue, body aches, difficulty sleeping, or decreased appetite, are also common.   What to expect   If you follow this treatment plan, you should feel better in 1 to 3 weeks.   You can return to your normal activities when ALL of the following are true:    You've been fever-free for more than 24 hours without using fever-reducing medications such as Tylenol    Your other symptoms have improved    It's been at least 10 days since your symptoms first started   When to seek care   Call us at [phone]   with any sudden or unexpected symptoms.    Fever that measures over 103F or continues for more than 3 days.    A worsening headache.    Your shortness of breath becomes severe.    You need to use your albuterol inhaler more often than every 2 to 3 hours.    Sinus pain that lasts for more than 10 days, without improvement.    Your throat pain becomes worse and makes swallowing extremely difficult or impossible.    More than 5 episodes of diarrhea in a day.    More than 5 episodes of vomiting in a day.    Coughing up red or bloody mucus.    Severe stomach pain.    Symptoms that get better for a few days, and then suddenly get worse.    Severe chest pain   Other treatment    Rest! Your body needs rest to recover and fight infection.    Drink plenty of water to stay hydrated.    Use a clean humidifier or a cool-mist vaporizer in your room at night. Breathing humid air may help you feel better.    Try non-prescription saline nasal sprays to help your nasal symptoms.    Try using a Neti Pot to flush out your stuffy nose and sinuses. Neti Pots are available at any drugstore without a prescription.    Gargle with salt water several times a day to help your throat feel better. Cough drops and throat lozenges may provide  extra relief. A teaspoon of honey stirred into warm water or weak tea can help soothe a sore throat and cough.    Avoid smoke and air pollution. Smoke can make infections worse.   Prevention    Avoid close contact with other people when you're sick.    Cover your mouth and nose when you cough or sneeze. Use a tissue or cough into your elbow. Make sure that used tissues go directly into the trash.    Avoid touching your eyes, nose, or mouth while you're sick.    Wash your hands often, especially after coughing, sneezing, or blowing your nose. If soap and water are not available, use an alcohol-based hand .    If you or someone in your home or workplace is sick, disinfect commonly used items. This includes door handles, tables, computers, remotes, and pens.    Coronavirus (COVID-19) information   Common symptoms of COVID-19 include fever, cough, shortness of breath, fatigue, muscle or body aches, headaches, new loss of sense of taste or smell, sore throat, stuffy or runny nose, nausea or vomiting, and diarrhea. Most people who get COVID-19 have mild symptoms and can rest at home until they get better. Elderly people and those with chronic medical problems may be at risk for more serious complications.   FAQs about the COVID-19 vaccine   There are three authorized COVID-19 vaccines: Brannon & Brannon's Asha Vaccine (J&J/Asha), Moderna, and Pfizer-BioNTech (Pfizer). The J&J/Asha and Moderna vaccines are approved for use in people aged 18 and older. The Pfizer vaccine is approved for those aged 5 and older. All three are available at no cost. Even if you don't have health insurance, you can still get the COVID-19 vaccine for free.   Which vaccine is the best? Which vaccine should I get?   All three vaccines are highly effective. Even if you get COVID after being vaccinated, all of the vaccines help prevent severe disease, hospitalization, and complications.   Most people should get whichever vaccine is  first available to them. However, women younger than 50 years old should consider the rare risk of blood clots with low platelets after vaccination with the J&J/Asha vaccine. This risk hasn't been seen with the other two vaccines.   Are the vaccines safe?   Yes. Hundreds of millions of people in the [place] have already safely received COVID-19 vaccines. As part of Phase 3 clinical trials in the [place] and other countries, researchers collected safety and efficacy data for all three vaccines. These clinical trials follow strict standards. Before a vaccine is approved, the manufacturing company must submit data to the Food and Drug Administration (FDA) for review. Tens of thousands of volunteers participated in the clinical trials for the vaccines. The FDA continues to monitor safety data as the vaccines are given to the general population.   Do I need the vaccine if I've already had COVID?   Yes. Vaccination helps protect you even if you've already had COVID.   If you had COVID-19 and had symptoms, wait to get vaccinated until ALL of the following are true:    10 days since your symptoms started    24 hours with no fever, without the use of fever-reducing medications    Your other COVID-19 symptoms are improving   If you tested positive for COVID-19 but did not have symptoms, you can get vaccinated after 10 days have passed since you had a positive test, as long as you don't develop symptoms.   If you had COVID-19 and were treated with monoclonal antibodies, you should wait 90 days before getting a COVID-19 vaccination.   How many doses of the vaccine do I need?   J&J/Asha: one dose.   Moderna: two doses, spaced 4 weeks apart.   Pfizer vaccine: two doses, spaced 3 weeks apart.   When am I considered fully vaccinated?   J&J/Asha: 14 days after you get the shot.   Moderna: 14 days after your second dose.   Pfizer: 14 days after your second dose.   What if I miss the second dose of the Moderna or Pfizer vaccine?    Contact your healthcare provider to discuss your options. While one dose of the vaccine may provide some protection against COVID, you need both doses for maximum protection.   What are the common side effects of the vaccine?   A sore arm, tiredness, headache, and muscle pain may occur within two days of getting the vaccine and last a day or two. For the Moderna or Pfizer vaccines, side effects are more common after the second dose. People over the age of 55 are less likely to have side effects than younger people.   After I'm fully vaccinated, can I still get or spread COVID?   Yes, but your disease should be milder, and your risk of serious illness, hospitalization, and complications will be much lower. And being vaccinated reduces the risk of spreading the disease if you get it.   After I'm fully vaccinated, can I go back to normal?    You should still wear a mask indoors in public if:    It's required by laws, rules, regulations, or local guidance.    You have a weakened immune system.    Your age puts you at increased risk of severe disease.    You have a medical condition that puts you at increased risk of severe disease.    Someone in your household has a weakened immune system, is at increased risk for severe disease, or is unvaccinated.    You're in an area of high transmission.    For travel information, see the CDC's latest guidance  .    Even after you're fully vaccinated, you should still:    Get tested and stay away from others if you develop symptoms of COVID-19.    Stay home and away from other private or public settings if you've tested positive for COVID-19 in the previous 10 days.    Continue to follow any applicable laws, rules, and regulations.    If you're exposed to COVID-19 after being fully vaccinated, you should get tested 3 to 5 days after exposure, even if you don't have symptoms. Wear a mask indoors in public for 14 days following exposure, or until you've confirmed your test result is  "negative. If you test positive for COVID-19, you should isolate at home for 10 days.   I'm fully vaccinated but have heard about a \"third dose\" and \"fourth dose.\" Do these apply to me?   Third and fourth doses are needed for some immunocompromised people.   You should get a third dose if ALL of the following are true:    You have a moderately to severely weakened immune system    You've had two doses of the Moderna or Pfizer vaccine    It's been at least 28 days since your second dose   You should get a fourth dose if ALL of the following are true:    You have a moderately to severely weakened immune system    You've had three doses of the Moderna or Pfizer vaccine    It's been at least 6 months since your third dose   If any of these situations apply, you have a moderately to severely weakened immune system:    You're getting active cancer treatment for a cancer or tumor of the blood    You've had an organ transplant and are taking medicine to suppress your immune system    You've had a stem cell transplant within the last 2 years    You're taking medicine to suppress your immune system, such as high-dose corticosteroids    You have moderate to severe primary immunodeficiency (such as DiGeorge syndrome, Wiskott-Wu syndrome)    You have advanced or untreated HIV infection   If you think you need a third or fourth dose, speak with your care team.   I'm fully vaccinated but have heard about \"boosters.\" Do these apply to me?   A booster shot is a \"top-up\" for people whose immunity from vaccination may have lessened over time.   If you got the J&J/Asha vaccine AND it's been at least 2 months since your shot, you should get a booster shot.   If you got the Pfizer or Moderna vaccine AND it's been at least 6 months since your second dose, you should get a booster shot if:    You're 65 or older    You're 18 or older and live in a long-term care facility    You're 18 or older and have an underlying medical condition, " such as:    Cancer    Chronic kidney disease    Chronic lung disease (COPD, moderate to severe asthma, interstitial lung disease, cystic fibrosis, or pulmonary hypertension)    Dementia or other neurological condition    Diabetes    Down syndrome    Heart condition, including heart failure, coronary artery disease, cardiomyopathies, or hypertension    HIV infection    A weakened immune system    Chronic liver disease    A mental health condition, including depression and schizophrenia spectrum disorders    Obesity    Pregnancy    Sickle cell disease or thalassemia    Smoking, current or former    Solid organ or blood stem cell transplant    Stroke or cerebrovascular disease    Substance use disorder    You're 18 or older and work or live in high-risk settings. This includes:    First responders (healthcare workers, firefighters, police)    Education staff (teachers, support staff,  workers)    Food and agriculture workers    Manufacturing workers    Corrections workers    [employer] workers    Public transit workers    Grocery store workers   If you need a booster shot, you can choose which vaccine to get. You can get the kind you originally received, or you can get a different kind. New CDC recommendations allow for mix-and-match dosing for booster shots. However, women younger than 50 years old should consider the rare risk of blood clots with low platelets after vaccination with the J&J/Asha vaccine. This risk hasn't been seen with the other two vaccines.   If you think you need a booster shot, speak with your care team.   General information about COVID-19   What should I do if I'm exposed to someone with COVID-19?   In general, you need to be in close contact with someone who has COVID-19 to get infected. Close contact means:    Living in the same household as someone with COVID-19.    Caring for someone with COVID-19.    Being within 6 feet of someone with COVID-19 for a total of at least 15  minutes over a 24-hour period.    Being in direct contact with respiratory droplets from someone with COVID-19 (for example, being coughed on, kissing, or sharing utensils).   If you're exposed and not fully vaccinated:    Self-quarantine in your home for 14 days after your last known contact with the infected person. Because you can spread the disease before you have symptoms, it's very important that you stay home AT ALL TIMES, unless you need medical care. Don't go to work, school, or public places, including grocery stores and pharmacies. Avoid public transportation, ride-sharing, and taxis.    Watch for the common symptoms of COVID-19: fever, cough, shortness of breath, fatigue, muscle or body aches, headache, new loss of sense of taste or smell, sore throat, stuffy or runny nose, nausea or vomiting, and diarrhea.   What if I develop symptoms of COVID-19?   CALL your healthcare provider or clinic right away to discuss next steps if you have any of the following risk factors:    Age 65 or older    Pregnant    Chronic medical condition such as diabetes, liver disease, kidney disease requiring dialysis, heart disease, high blood pressure, severe obesity, or lung disease (including moderate to severe asthma)    A medical condition that affects your immune system    Taking a medication that affects your immune system   Otherwise, if your symptoms are mild, you don't need to call your healthcare provider or be seen for an exam. You can recover at home and should feel better within a few weeks. Because COVID-19 is highly contagious, it's important that you avoid close contact with others while you're recovering. This means staying home AT ALL TIMES, unless you need medical care. Don't go to work, school, or public places, including grocery stores and pharmacies. Avoid public transportation, ride-sharing, and taxis.   There are currently no specific medications to treat this infection. Over-the-counter cold medications  can help ease symptoms.   To prevent the spread of COVID-19 to the people and animals in your household:    Stay in a specific room away from other people in your home, and use a separate bathroom if possible.    Wear a mask when close contact with household members can't be avoided.   You can return to your normal activities when ALL of the following are true:    Your symptoms have improved    It's been at least 10 days since your symptoms first started    You've been fever-free for more than 24 hours without using fever-reducing medications such as Tylenol   When to get care   Call your healthcare provider immediately if you have any of the following:    Fever over 103F    Fever that doesn't come down after taking medications such as Tylenol or ibuprofen    Fever that returns after being gone for more than 24 hours    Fever lasting more than 4 days    Worsening shortness of breath or difficulty breathing   Go to your nearest ER or call 911 if you have any of the following:    Shortness of breath that makes it hard to do simple things like get dressed, bathe, or comb your hair    Persistent chest pain or chest tightness    New confusion or difficulty staying alert    Bluish color to the lips or face    Flu vaccine information   Getting a flu vaccine this year is more important than ever. The vaccine not only protects you and the people around you from the flu, it also helps reduce the strain on healthcare systems responding to the COVID-19 pandemic.   Who should get a flu vaccine?   Everyone 6 months of age and older should get a yearly flu vaccine.   When should I get vaccinated?   You should get a flu vaccine by the end of October. Once you're vaccinated, it takes about two weeks for antibodies to develop and protect you against the flu. That's why it's important to get vaccinated as soon as possible.   After October, is it too late to get vaccinated?   No. You should still get vaccinated. As long as the flu  viruses are still in your community, flu vaccines will remain available, even into January of next year or later.   Why do I need a flu vaccine EVERY year?   Flu viruses are constantly changing, so flu vaccines are usually updated from one season to the next. Your protection from the flu vaccine also lessens over time.   Is the flu vaccine safe?   Yes. Over the last 50 years, hundreds of millions of Americans have safely received the flu vaccines.   What are the side effects of flu vaccines?   You CANNOT get the flu from a flu vaccine. Common side effects of the flu shot include soreness, redness and/or swelling where the shot was given, low grade fever, and aches. Common side effects of the nasal spray flu vaccine for adults include runny nose, headaches, sore throat, and cough. For children, side effects include wheezing, vomiting, muscle aches, and fever.   Does the flu vaccine increase your risk of getting COVID-19?   No. There is no evidence that getting a flu vaccine increases your risk of getting COVID-19.   Is it safe to get the flu vaccine along with a COVID-19 vaccine?   Yes. It's safe to get the flu vaccine with a COVID vaccine or booster.   Contact your healthcare provider TODAY for details on when and where to get your flu vaccine.   Your provider   Your diagnosis was provided by Aminah Escudero, a member of your trusted care team at [facility].   If you have any questions, call us at [phone]  .

## 2021-12-02 NOTE — E-VISIT TREATED
Chief Complaint: Coronavirus (COVID-19), cold, sinus pain, allergy, or flu   Patient introduction   Patient is 50-year-old male who reports cough, shortness of breath, congestion, and sore throat that started < 48 hours ago.   Patient has not requested COVID testing.   Coronavirus Disease 2019 (COVID-19) exposure, testing history, and vaccination status:    No known exposure to a confirmed or suspected case of COVID-19.    No recent travel outside of their local community.    No history of COVID-19 testing.    Reports receiving 3 doses.    Received the Moderna vaccine for the first dose.    Received the Moderna vaccine for the second dose.    Received the Moderna vaccine for the third dose.    Received their most recent dose of the vaccine 7-14 days ago.   When asked why they're seeking care online today, patient reports they want a specific treatment or medication. Patient writes: Bronchitis treatment   Patient-submitted comments:   Patient writes: Starting to get that irritation/burn when coughing. I typically develop bronchitis this time of year..   Patient did not request an excuse note.   General presentation   Symptoms came on suddenly.   Fever:    Denies fever.   Sinus and nasal symptoms:    Reports clear nasal drainage.    Nasal drainage is thin.    Reports postnasal drip.    Reports congestion without associated pain or pressure.    Denies rhinitis.    Denies itchy nose or sneezing.   Sore throat:    Reports sore throat.    Denies recent strep exposure.    Reports discomfort when swallowing but affirms ability to swallow liquids and solid foods.   Head and body aches:    Denies headache.    Denies sweats.    Denies chills.    Denies myalgia.    Denies fatigue.   Dizziness:    Denies dizziness.   Cough:    Reports cough.    Cough is worse in the morning, during the day, and at night/while sleeping.    Cough is mildly productive of sputum.    Describes color of mucus as yellow.   Wheezing and SOB:     Reports wheezing.    Reports mild shortness of breath that does not affect ADLs. Despite shortness of breath, patient can speak normally. Despite shortness of breath, patient can take a full, deep breath (inhaling for 3-4 seconds).    Reports previous albuterol inhaler use during URIs, bronchitis, or pneumonia.    Reports previous steroid inhaler use during URIs, bronchitis, or pneumonia.    Reports using albuterol for current symptoms.    Reports using albuterol every 6 hours or longer.    Denies COPD diagnosis.    Denies asthma diagnosis.   Chest pain:    Denies chest pain.   Allergies:    Denies history of allergies.   Flu exposure:    Denies recent exposure to confirmed flu diagnosis.    Denies receiving a flu vaccine this season.   Patient denies the following red flags:    Severe shortness of breath    Inability to repeat a sentence without stopping for breath    Inability to take a full breath lasting 3-4 seconds    Changes in alertness or awareness    Symptoms suggesting respiratory distress    Symptoms suggesting airway obstruction    Decreased urination   Self-exam:    No difficulty moving their chin toward their chest    Neck lymph nodes feel normal   Denies antibiotic treatment for similar symptoms within the past month.   Current medications   Denies taking over-the-counter medication for current symptoms.   Denies taking other medications or supplements.   Medication allergies   None.   Medication contraindication review   Denies history of anaphylactic reaction to beta-lactam antibiotics; aspirin triad; blood dyscrasia; bone marrow depression; catecholamine-releasing paraganglioma; coronary artery disease; coagulation disorder; congenital long QT syndrome; depression; electrolyte abnormalities; fungal infection; GI bleeding; GI obstruction; G6PD deficiency; heart arrhythmia; hypertension; kidney disease or hemodialysis; mononucleosis; myasthenia; recent myocardial infarction; NSAID-induced  asthma/urticaria; Parkinson's disease; pheochromocytoma; porphyria; Reye syndrome; seizure disorder; ulcerative colitis; and urinary retention.   Denies history of metoclopramide-associated dystonic reaction and tardive dyskinesia.   No known history of amoxicillin-clavulanate-associated cholestatic jaundice or hepatic impairment.   No known history of azithromycin-associated cholestatic jaundice or hepatic impairment.   Past medical history   Immune conditions: Denies immunocompromising conditions. Denies history of cancer.   Social history   Non-smoker.   Assessment   Acute bronchitis.   This is the likely diagnosis based on patient's interview responses, including:    Cough that is productive of sputum   HHS required information for COVID-19 lab data reporting (if test is ordered):   Symptoms:    Shortness of breath    Cough    Sore throat    Nasal congestion   Symptom onset: < 48 hours ago ago    Healthcare worker? No  [facility] No  Previous history of COVID-19 testing: None     Plan   Medications:    albuterol sulfate HFA 90 mcg/actuation aerosol inhaler RX 90mcg/puff 2 puffs inhaled q6h PRN 10d for wheezing. If symptoms are severe, may use as often as every 4 hours. Amount is 18 g.    benzonatate 200 mg capsule RX 200mg 1 cap PO tid PRN 7d for cough. Do not chew or cut these capsules. Amount is 21 cap.   The patient's prescriptions will be sent to:   [id number]   [address] [place] [id number]   Phone: [phone]     Fax: [phone]   Education:    Condition and causes    Prevention    Treatment and self-care    When to call provider      ----------   Electronically signed by LIZA Virgen on 2021-12-02 at 07:47AM   ----------   Patient Interview Transcript:   Why are you getting care through eVisit today? We can't guarantee a specific treatment or test. Your provider will decide what's best for you. You'll have a chance to tell us  more at the end of the interview. Select all that apply.    I want a specific treatment or medication   Not selected:    I want to know if I have a cold or something more serious    I want to know if I need to be seen by a provider    I need a doctor's note    I want to be tested for COVID-19    I want to get the COVID-19 vaccine    I think I'm having side effects from the COVID-19 vaccine    I just want to feel better!    None of the above   Tell us which specific treatment or medication you'd like. Your provider will make the final decision on which treatment is best for your condition.    Bronchitis treatment   Which of these symptoms are bothering you? Select all that apply.    Cough    Shortness of breath    Stuffed-up nose or sinuses    Sore throat   Not selected:    Fever    Runny nose    Itchy or watery eyes    Itchy nose or sneezing    Loss of smell or taste    Hoarse voice or loss of voice    Headache    Sweats    Chills    Muscle or body aches    Fatigue or tiredness    Nausea or vomiting    Diarrhea    I don't have any of these symptoms   Before we learn more about why you're here, we'll get some information related to COVID-19. We'll ask about risk factors, testing, vaccination status, and exposure. Do you have any of these conditions? If so, you may be at increased risk for complications from COVID-19. Select all that apply.    None of the above   Not selected:    Chronic lung disease, such as cystic fibrosis or interstitial fibrosis    Heart disease, such as congenital heart disease, congestive heart failure, or coronary artery disease    Disorder of the brain, spinal cord, or nerves and muscles, such as dementia, cerebral palsy, epilepsy, muscular dystrophy, or developmental delay    Metabolic disorder or mitochondrial disease    Cerebrovascular disease, such as stroke or another condition affecting the blood vessels or blood supply to the brain   Do you live in a group care setting? Examples  "include: - Nursing home - Residential care - Psychiatric treatment facility - Group home - Dormitory - Board and care home - Homeless shelter - Foster care setting Select one.    No   Not selected:    Yes   Have you ever been tested for COVID-19? Select one.    No   Not selected:    Yes   Have you gotten the COVID-19 vaccine? Select one.    Yes   Not selected:    No   How many doses of the COVID-19 vaccine have you gotten? This includes boosters. Select one.    3 doses   Not selected:    1 dose    2 doses   Which COVID-19 vaccine did you get for your first dose? Check your Vaccination Record Card under Product Name/. Select one.    Moderna   Not selected:    Brannon & Brannon's Asha Vaccine (J&J/Asha)    Pfizer-BioNTech (Pfizer)   Which COVID-19 vaccine did you get for your second dose? Check your Vaccination Record Card under Product Name/. Select one.    Moderna   Not selected:    Brannon & Brannon's Asha Vaccine (J&J/Asha)    Pfizer-BioNTech (Pfizer)   Which COVID-19 vaccine did you get for your third dose? Check your Vaccination Record Card under Product Name/. Select one.    Moderna   Not selected:    Brannon & Brannon's Asha Vaccine (J&J/Asha)    Pfizer-BioNTech (Pfizer)   When did you get your most recent dose of the COVID-19 vaccine?    7 to 14 days ago   Not selected:    Less than 48 hours (2 days) ago    48 to 72 hours (3 days) ago    3 to 5 days ago    5 to 7 days ago    More than 14 days ago   In the last 14 days, have you traveled outside of your local community? This includes travel by car, RV, bus, train, or plane. Travel increases your chances of getting and spreading COVID-19. Select one.    No   Not selected:    Yes   In the last 14 days, have you had close contact with someone who has coronavirus (COVID-19)? \"Close contact\" means any of these: - Living in the same household as someone with COVID-19. - Caring for someone with COVID-19. - Being " "within 6 feet of someone with COVID-19 for a total of at least 15 minutes over a 24-hour period. For example, three 5-minute exposures for a total of 15 minutes. - Being in direct contact with respiratory droplets from someone with COVID-19 (being coughed on, kissing, sharing utensils). Select one.    No, not that I know of   Not selected:    Yes, a confirmed case    Yes, a suspected case   Thanks for completing our COVID-19 questions. Now we'll return to your symptoms. When did your symptoms start? If you know the exact date your symptoms started, choose Other and enter the month and day. Select one.    Less than 48 hours ago   Not selected:    3 to 5 days ago    6 to 9 days ago    10 to 14 days ago    2 to 4 weeks ago    More than a month ago    Other (specify)   Did your symptoms come on suddenly or gradually? Select one.    Suddenly   Not selected:    Gradually    I'm not sure   How would you describe your shortness of breath? Sometimes shortness of breath can be a sign of a more serious condition. Select one.    Mild (about what I normally have with a cold)   Not selected:    Moderate (it's bad, but I can still do simple things like get dressed, bathe, or comb my hair)    Severe (it's so bad that I can't do simple things like get dressed, bathe, or comb my hair)   We'd like to find out more about your shortness of breath. Try to say the following sentence out loud: \"I went to the store today to buy bread, milk, and eggs.\" Are you able to get to the end of the sentence without stopping for breath? If not, you may need emergency care.    Yes   Not selected:    No   Are you able to take a full, deep breath? A full, deep breath means inhaling for 3 to 4 seconds. If you can't do this, you may need to seek emergency care. Select one.    Yes   Not selected:    No   Do you cough so hard that it's made you gag or vomit? By gag, we mean has your coughing made you choke or dry heave? Select all that apply.    No   Not " "selected:    Yes, my coughing has made me gag    Yes, my coughing has made me vomit   When is your cough the worst? Select all that apply.    In the morning, or when I wake up    During the day    At nighttime, or while I'm sleeping   Not selected:    I'm not sure   Are you coughing up mucus or phlegm? Select one.    Yes, a little   Not selected:    No, my cough is dry    Yes, a lot   What color is most of the mucus or phlegm that you're coughing up? Select one.    Yellow   Not selected:    Clear    White/frothy    Green    Red or pink    I'm not sure   You mentioned having a stuffy nose or sinus congestion. Do you feel pain or pressure in your sinuses?    No   Not selected:    Yes    I'm not sure   What color is your nasal drainage? Select one.    Clear   Not selected:    White    Yellow    Green    My nose is stuffed but not draining or running    I'm not sure   Is your nasal drainage thick or thin? Select one.    Thin   Not selected:    Thick    I'm not sure   Is there any drainage (mucus) going down the back of your throat? This kind of drainage is also called \"postnasal drip.\" Select one.    Yes   Not selected:    No    I'm not sure   Can you swallow liquids and solid foods? A sore throat may be painful when swallowing, but it shouldn't prevent you from swallowing. Select one.    Yes, but it's uncomfortable   Not selected:    Yes, with ease    Yes, but it's painful    It's hard to swallow anything because it feels like liquids and food get stuck in my throat    No, I can't swallow anything, liquid or solid foods   Since your symptoms started, have you felt dizzy? Select one.    No   Not selected:    Yes, but I can continue with my regular daily activities    Yes, and it makes it hard to stand, walk, or do daily activities   Do you have chest pain? You might also feel it as discomfort, aching, tightness, or squeezing in the chest. Select one.    No   Not selected:    Yes   Have you urinated at least 3 times in " the last 24 hours? Select one.    Yes   Not selected:    No    I'm not sure   Changes in alertness or awareness may mean you need emergency care. Since your symptoms started, have you had any of these? Select all that apply.    None of the above   Not selected:    Confusion    Slurred speech    Not knowing where you are or what day it is    Difficulty staying conscious    Fainting or passing out   Do your symptoms include a whistling sound, or wheezing, when you breathe? Select one.    Yes   Not selected:    No    I'm not sure   Do you have any of these symptoms in your ear(s)? Select all that apply.    None of the above   Not selected:    Pain    Pressure    Fullness    Crackling or popping    Plugged or blocked sensation   Can you move your chin toward your chest?    Yes   Not selected:    No, my neck is too stiff   Are your tonsils larger than usual?    I'm not sure   Not selected:    Yes    No    I've had my tonsils removed   Is there any white or yellow pus on your tonsils?    I'm not sure   Not selected:    Yes    No   Are there red spots on the roof of your mouth or the back of your throat?    I'm not sure   Not selected:    Yes    No   Are your glands/lymph nodes swollen, or does it hurt when you touch them?    No   Not selected:    Yes    I'm not sure   People with a very high body mass index (BMI) are at higher risk for developing complications from the flu and severe illness from COVID-19. To determine your BMI, we need to know your weight and height. Please enter your weight (in pounds).    Weight   Please enter your height.    Height   In the past 2 weeks, has anyone around you (such as at school, work, or home) had a confirmed diagnosis of strep throat? A confirmed diagnosis means that a throat swab and lab test were done to verify a strep throat infection. Select one.    No   Not selected:    Yes    I'm not sure   Do you think you might have strep throat? Select one.    I'm not sure   Not selected:     Yes    No   In the past week, has anyone around you (such as at school, work, or home) had a confirmed diagnosis of the flu? A confirmed diagnosis means that a nose swab was done to verify a flu infection. Select all that apply.    No   Not selected:    I live with someone who has the flu    I've been within touching distance of someone who has the flu    I've walked by, or sat about 3 feet away from, someone who has the flu    I've been in the same building as someone who has the flu    I'm not sure   Have you ever been diagnosed with asthma? Select one.    No   Not selected:    Yes   Have you ever been prescribed albuterol to use for wheezing, cough, or shortness of breath caused by a cold, bronchitis, or pneumonia? Albuterol (ProAir, Proventil, Ventolin) is prescribed as an inhaler or a solution to be used with a nebulizer machine. Select one.    Yes   Not selected:    No    I'm not sure   Have you ever been prescribed a steroid inhaler to use for wheezing, cough, or shortness of breath caused by a cold, bronchitis, or pneumonia? Some examples of steroid inhalers include Pulmicort, Flovent, Qvar, and Alvesco. Select one.    Yes   Not selected:    No    I'm not sure   Have you used albuterol for your current symptoms? This includes both albuterol inhalers and albuterol solution in a nebulizer machine. Select one.    Yes   Not selected:    No, I don't have any, or it's     No, I don't feel like I need it   How often are you using albuterol? If you're using albuterol very frequently, you'll need to be seen in person. Select one.    Every 6 hours or longer   Not selected:    More than once an hour    Every 1 to 2 hours    Every 2 to 3 hours    Every 3 to 4 hours    Every 4 to 6 hours   Would you like a prescription for albuterol? Select one.    No   Not selected:    Yes   Have you ever been diagnosed with chronic obstructive pulmonary disease (COPD)? Select one.    No   Not selected:    Yes    I'm not sure    Do you have allergies (pollen, dust mites, mold, animal dander)? Select one.    No   Not selected:    Yes    I'm not sure   Have you had a flu shot this season? Select one.    No   Not selected:    Yes, less than 2 weeks ago    Yes, 2 to 4 weeks ago    Yes, 1 to 3 months ago    Yes, 3 to 6 months ago    Yes, more than 6 months ago    I'm not sure   The flu and COVID-19 can be more serious for people with certain conditions or characteristics. These questions help us figure out if you or anyone you live with is at higher risk for complications from these infections. Do either of these statements apply to you? Select all that apply.    None of the above   Not selected:    I'm  or Native [place]    I'm a healthcare worker   Do you smoke tobacco? Select one.    No, never   Not selected:    Yes, every day    Yes, some days    No, I quit   Some conditions can put you at risk for more serious infections. Do any of these apply to you? Select all that apply.    None of the above   Not selected:    I've been hospitalized within the last 5 days    I have diabetes    I'm in close contact with a child in    Are you currently being treated for any of these conditions? Scroll to see all options. Select all that apply.    None of the above   Not selected:    Aspirin triad (also known as Samter's triad or ASA triad)    Asthma or hives from taking aspirin or other NSAIDs, such as ibuprofen or naproxen    Blockage or narrowing of the blood vessels of the heart    Blood dyscrasia, such anemia, leukemia, lymphoma, or myeloma    Bone marrow depression    Catecholamine-releasing paraganglioma    Blood clotting disorder    Congenital long QT syndrome    Depression    Difficulty urinating or completely emptying your bladder    Uncorrected electrolyte abnormalities    Fungal infection    Gastrointestinal (GI) bleeding    Gastrointestinal (GI) obstruction    G6PD deficiency    Recent heart attack    High blood pressure     Irregular heartbeat or heart rhythm    Kidney disease or hemodialysis    Mononucleosis (mono)    Myasthenia gravis    Parkinson's disease    Pheochromocytoma    Reye syndrome    Seizure disorder    Ulcerative colitis   Do you have any of these conditions that can affect the immune system? Scroll to see all options. Select all that apply.    None of these   Not selected:    History of bone marrow transplant    Chronic kidney disease    Chronic liver disease (including cirrhosis)    HIV/AIDS    Inflammatory bowel disease (Crohn's disease or ulcerative colitis)    Lupus    Moderate to severe plaque psoriasis    Multiple sclerosis    Rheumatoid arthritis    Sickle cell anemia    Alpha or beta thalassemia    History of solid organ transplant (kidney, liver, or heart)    History of spleen removal    An autoimmune disorder not listed here    A condition requiring treatment with long-term use of oral steroids (such as prednisone, prednisolone, or dexamethasone)   Have you ever been diagnosed with cancer? Select one.    No   Not selected:    Yes, I have cancer now    Yes, but I'm in remission   Have you ever had either of these conditions? Select all that apply.    No   Not selected:    Metoclopramide-associated dystonic reaction    Tardive dyskinesia   Do any of these apply to the people who live with you? Select all that apply.    None of the above   Not selected:    A child under the age of 5    An adult 65 or older    A person who is pregnant    A person who has given birth, had a miscarriage, had a pregnancy loss, or had an  in the last 2 weeks    An  or Native [place]   Does any member of your household have any of these medical conditions? Select all that apply.    None of the above   Not selected:    Asthma    Disorders of the brain, spinal cord, or nerves and muscles, such as dementia, cerebral palsy, epilepsy, muscular dystrophy, or developmental delay    Chronic lung disease, such as COPD  or cystic fibrosis    Heart disease, such as congenital heart disease, congestive heart failure, or coronary artery disease    Cerebrovascular disease, such as stroke or another condition affecting the blood vessels or blood supply to the brain    Blood disorders, such as sickle cell disease    Diabetes    Metabolic disorders such as inherited metabolic disorders or mitochondrial disease    Kidney disorders    Liver disorders    Weakened immune system due to illness or medications such as chemotherapy or steroids    Children under the age of 19 who are on long-term aspirin therapy    Extreme obesity (BMI > 40)   Just a few more questions about medications, and then you're finished. Have you used any non-prescription medications or nasal sprays for your current symptoms? Examples include saline sprays, decongestants, NyQuil, and Tylenol. Select one.    No   Not selected:    Yes   In the past month, have you taken antibiotics for similar symptoms? Examples of antibiotics include amoxicillin, amoxicillin-clavulanate (Augmentin), penicillin, cefdinir (Omnicef), doxycycline, and clindamycin (Cleocin). Select one.    No   Not selected:    Yes    I'm not sure   Have you taken any monoamine oxidase inhibitor (MAOI) medications in the last 14 days? Examples include rasagiline (Azilect), selegiline (Eldepryl, Zelapar), isocarboxazid (Marplan), phenelzine (Nardil), and tranylcypromine (Parnate). Select one.    No   Not selected:    Yes    I'm not sure   Do you take Kynmobi or Apokyn (apomorphine)? Select one.    No   Not selected:    Yes    I'm not sure   Are you taking any other medications or supplements? On the next screen, you need to list all vitamins, supplements, non-prescription medications (such as aspirin or Aleve), and prescription medications that you're taking. Select one.    No   Not selected:    Yes    Yes, but I'm not sure what they are   Have you ever had an allergic or bad reaction to any medication? Select  one.    No   Not selected:    Yes   Are you allergic to milk or to the proteins found in milk (for example, whey or casein)? A milk allergy is different from lactose intolerance. Select one.    No   Not selected:    Yes    I'm not sure   Have you ever had jaundice or liver problems as a result of taking amoxicillin-clavulanate (Augmentin)? Jaundice is a condition in which the skin and the whites of the eyes turn yellow. Select all that apply.    No, not that I know of   Not selected:    Yes, jaundice    Yes, liver problems   Have you ever had jaundice or liver problems as a result of taking azithromycin (Zithromax, Zmax)? Jaundice is a condition in which the skin and the whites of the eyes turn yellow. Select all that apply.    No, not that I know of   Not selected:    Yes, jaundice    Yes, liver problems   Do you need a doctor's note? A doctor's note confirms that you received care today and states when you can return to school or work. It does not contain information about your diagnosis or treatment plan. Your provider will make the final decision on whether to give you a doctor's note and for how long. Doctor's notes CANNOT be backdated. We can't provide medical leave paperwork through this type of visit. If more paperwork is needed to request time off, contact your primary care provider. Select one.    No   Not selected:    Today only (1 day)    Today and tomorrow (2 days)    3 days    7 days    10 days    14 days   Is there anything else you'd like to tell us about your symptoms?    Starting to get that irritation/burn when coughing. I typically develop bronchitis this time of year.   ----------   Medical history   Medical history data does not currently exist for this patient.

## 2022-01-11 ENCOUNTER — TRANSCRIBE ORDERS (OUTPATIENT)
Dept: ADMINISTRATIVE | Facility: HOSPITAL | Age: 52
End: 2022-01-11

## 2022-01-11 DIAGNOSIS — Z01.818 OTHER SPECIFIED PRE-OPERATIVE EXAMINATION: Primary | ICD-10-CM

## 2022-01-12 ENCOUNTER — LAB (OUTPATIENT)
Dept: LAB | Facility: HOSPITAL | Age: 52
End: 2022-01-12

## 2022-01-12 DIAGNOSIS — Z01.818 OTHER SPECIFIED PRE-OPERATIVE EXAMINATION: ICD-10-CM

## 2022-01-12 LAB — SARS-COV-2 RNA PNL SPEC NAA+PROBE: NOT DETECTED

## 2022-01-12 PROCEDURE — C9803 HOPD COVID-19 SPEC COLLECT: HCPCS

## 2022-01-12 PROCEDURE — 87635 SARS-COV-2 COVID-19 AMP PRB: CPT | Performed by: FAMILY MEDICINE

## 2022-01-14 ENCOUNTER — HOSPITAL ENCOUNTER (OUTPATIENT)
Dept: PULMONOLOGY | Facility: HOSPITAL | Age: 52
Discharge: HOME | End: 2022-01-14
Admitting: FAMILY MEDICINE

## 2022-01-14 VITALS — OXYGEN SATURATION: 98 % | HEART RATE: 63 BPM | RESPIRATION RATE: 20 BRPM

## 2022-01-14 DIAGNOSIS — R06.2 WHEEZING: ICD-10-CM

## 2022-01-14 LAB
BDY SITE: ABNORMAL
HGB BLDA-MCNC: 16.1 G/DL (ref 14–18)
Lab: ABNORMAL
SAO2 % BLDCOA: 93.6 % (ref 94–99)

## 2022-01-14 PROCEDURE — 94060 EVALUATION OF WHEEZING: CPT

## 2022-01-14 PROCEDURE — 94729 DIFFUSING CAPACITY: CPT

## 2022-01-14 PROCEDURE — 94726 PLETHYSMOGRAPHY LUNG VOLUMES: CPT

## 2022-01-14 PROCEDURE — 82820 HEMOGLOBIN-OXYGEN AFFINITY: CPT

## 2022-01-14 RX ORDER — ALBUTEROL SULFATE 90 UG/1
4 AEROSOL, METERED RESPIRATORY (INHALATION) ONCE
Status: COMPLETED | OUTPATIENT
Start: 2022-01-14 | End: 2022-01-14

## 2022-01-14 RX ADMIN — ALBUTEROL SULFATE 4 PUFF: 90 AEROSOL, METERED RESPIRATORY (INHALATION) at 13:18

## 2022-01-22 DIAGNOSIS — R06.2 WHEEZING: ICD-10-CM

## 2022-01-24 RX ORDER — ALBUTEROL SULFATE 90 UG/1
2 AEROSOL, METERED RESPIRATORY (INHALATION) EVERY 4 HOURS PRN
Qty: 6.7 G | Refills: 3 | Status: SHIPPED | OUTPATIENT
Start: 2022-01-24

## 2022-01-28 ENCOUNTER — OFFICE VISIT (OUTPATIENT)
Dept: INTERNAL MEDICINE | Facility: CLINIC | Age: 52
End: 2022-01-28

## 2022-01-28 VITALS
WEIGHT: 215 LBS | HEIGHT: 76 IN | DIASTOLIC BLOOD PRESSURE: 70 MMHG | SYSTOLIC BLOOD PRESSURE: 114 MMHG | HEART RATE: 60 BPM | BODY MASS INDEX: 26.18 KG/M2 | OXYGEN SATURATION: 95 % | TEMPERATURE: 98 F

## 2022-01-28 DIAGNOSIS — F90.9 ADULT ADHD: ICD-10-CM

## 2022-01-28 DIAGNOSIS — Z00.00 ANNUAL PHYSICAL EXAM: Primary | ICD-10-CM

## 2022-01-28 DIAGNOSIS — Z86.39 HISTORY OF HYPERLIPIDEMIA: ICD-10-CM

## 2022-01-28 DIAGNOSIS — Z13.228 SCREENING FOR METABOLIC DISORDER: ICD-10-CM

## 2022-01-28 DIAGNOSIS — F90.9 ADULT ADHD: Primary | ICD-10-CM

## 2022-01-28 DIAGNOSIS — J45.30 MILD PERSISTENT ASTHMA, UNSPECIFIED WHETHER COMPLICATED: ICD-10-CM

## 2022-01-28 PROCEDURE — 99396 PREV VISIT EST AGE 40-64: CPT | Performed by: NURSE PRACTITIONER

## 2022-01-28 RX ORDER — DEXTROAMPHETAMINE SACCHARATE, AMPHETAMINE ASPARTATE, DEXTROAMPHETAMINE SULFATE AND AMPHETAMINE SULFATE 2.5; 2.5; 2.5; 2.5 MG/1; MG/1; MG/1; MG/1
10 TABLET ORAL 2 TIMES DAILY
Qty: 60 TABLET | Refills: 0 | Status: SHIPPED | OUTPATIENT
Start: 2022-01-28 | End: 2022-03-24 | Stop reason: SDUPTHER

## 2022-01-28 RX ORDER — FLUTICASONE PROPIONATE 50 MCG
2 SPRAY, SUSPENSION (ML) NASAL DAILY
Qty: 11.1 ML | Refills: 3 | Status: SHIPPED | OUTPATIENT
Start: 2022-01-28 | End: 2023-02-24

## 2022-01-28 NOTE — PROGRESS NOTES
Date of Encounter: 2022  Patient: Alphonso Myers,  1970    Subjective     Chief complaint: Annual physical    HPI   Patient here today for annual physical.  Patient states that he had a breathing test conducted a couple weeks ago and would like to discuss the results.  Patient states that he has had to use his inhaler on a regular basis.  Patient states that he uses it every 4 hours.      Patient not fasting today would like to come back to check his labs when he was fasting.  Patient states he has made some changes to his diet including cutting back on red meat.    Patient also wants to try a new ADHD medication.  Patient states that he had been on extended release 10 mg of Adderall previously.  States that it did not seem to help him quite enough.  Patient is in his busy season with work and would like a new medication.        Review of Systems:  Negative for fever, congestion, chest pain upon exertion,  vision changes, vomiting, dysuria, lymphadenopathy, muscle weakness, numbness, mood changes, rashes.  Positive for shortness of breath    The following portions of the patient's history were reviewed and updated as appropriate: allergies, current medications, past family history, past medical history, past social history, past surgical history and problem list.    Patient Active Problem List   Diagnosis   • Status post labral repair of shoulder, left   • Adult ADHD   • Wheezing   • Hyperlipidemia   • Mild persistent asthma     Past Medical History:   Diagnosis Date   • ADHD (attention deficit hyperactivity disorder)    • Bankart lesion of left shoulder    • Dislocation of left shoulder joint    • Status post labral repair of shoulder    • Tear of left glenoid labrum      Past Surgical History:   Procedure Laterality Date   • SHOULDER ARTHROSCOPY Left 2019    Left shoulder arthroscopy with extensive labral repair including capsulorrhaphy, reduction and fixation glenoid fracture, SLAP repair as  "well as chondroplasty       History reviewed. No pertinent family history.    Current Outpatient Medications:   •  albuterol sulfate  (90 Base) MCG/ACT inhaler, Inhale 2 puffs Every 4 (Four) Hours As Needed for Wheezing., Disp: 6.7 g, Rfl: 3  •  budesonide (PULMICORT) 90 MCG/ACT inhaler, Inhale 1 puff 2 (Two) Times a Day., Disp: 1 each, Rfl: 6  •  fluticasone (Flonase) 50 MCG/ACT nasal spray, 2 sprays into the nostril(s) as directed by provider Daily., Disp: 11.1 mL, Rfl: 3  No Known Allergies  Social History     Tobacco Use   • Smoking status: Never Smoker   • Smokeless tobacco: Never Used   Substance Use Topics   • Alcohol use: Yes   • Drug use: Not on file          Objective   Physical Exam   Vitals:    01/28/22 0815   BP: 114/70   Pulse: 60   Temp: 98 °F (36.7 °C)   TempSrc: Temporal   SpO2: 95%   Weight: 97.5 kg (215 lb)   Height: 193 cm (76\")     Body mass index is 26.17 kg/m².    Constitutional: NAD.  Eyes: EOMI. PERRLA. Normal conjunctiva.  Ear, nose, mouth, throat: No tonsillar exudates or erythema.   Normal nasal mucosa. Normal external ear canals and TMs bilaterally.  Cardiovascular: RRR. No murmurs. No LE edema b/l. Radial pulses 2+ bilaterally.  Pulmonary: Bilateral inspiratory wheeze in upper lobes.  Good effort.  Integumentary: No rashes or wounds on face or upper extremities.  Lymphatic: No anterior cervical lymphadenopathy.  Endocrine: No thyromegaly or palpable thyroid nodules.  Psychiatric: Normal affect. Normal thought content.  GI: Abdomen soft flat, bowel sounds present in all 4 quadrants, no pain with palpation.         Assessment/Plan   Assessment and Plan  Pleasant 51-year-old male with history of hyperlipidemia, adult ADHD and others here today for annual physical.    Diagnoses and all orders for this visit:    1. Annual physical exam (Primary)  -     CBC & Differential  -     Comprehensive Metabolic Panel    - We discussed preventative care including age and patient-appropriate " immunizations and cancer screening. We also discussed the importance of exercise and a healthy diet. This is their annual preventative exam.    2. Mild persistent asthma, unspecified whether complicated  -     budesonide (PULMICORT) 90 MCG/ACT inhaler; Inhale 1 puff 2 (Two) Times a Day.  Dispense: 1 each; Refill: 6  -     fluticasone (Flonase) 50 MCG/ACT nasal spray; 2 sprays into the nostril(s) as directed by provider Daily.  Dispense: 11.1 mL; Refill: 3   -Reviewed PFT with patient in detail.  Discussed about asthma and step care approach.  3. History of hyperlipidemia  -     Lipase  -Encourage patient to keep a low sugar, low-fat diet and get regular exercise    4. Screening for metabolic disorder  -     CBC & Differential  -     Comprehensive Metabolic Panel    5. Adult ADHD   -We will trial a nonextended release Adderall.  Patient PCP to send into pharmacy.         Patient verbalized understanding of and agreed to plan of care    Return in about 3 months (around 4/28/2022) for Recheck astham and adhd.     Crystal Phoenix DNP, FNP-C  Family Medicine  O: [phone]      Please note that portions of this note were completed with a voice recognition program. Please call if questions.

## 2022-02-02 LAB
ALBUMIN SERPL-MCNC: 4.4 G/DL (ref 4–5)
ALBUMIN/GLOB SERPL: 1.8 {RATIO} (ref 1.2–2.2)
ALP SERPL-CCNC: 44 IU/L (ref 44–121)
ALT SERPL-CCNC: 30 IU/L (ref 0–44)
AST SERPL-CCNC: 21 IU/L (ref 0–40)
BASOPHILS # BLD AUTO: 0 X10E3/UL (ref 0–0.2)
BASOPHILS NFR BLD AUTO: 1 %
BILIRUB SERPL-MCNC: 0.5 MG/DL (ref 0–1.2)
BUN SERPL-MCNC: 17 MG/DL (ref 6–24)
BUN/CREAT SERPL: 15 (ref 9–20)
CALCIUM SERPL-MCNC: 9.4 MG/DL (ref 8.7–10.2)
CHLORIDE SERPL-SCNC: 106 MMOL/L (ref 96–106)
CO2 SERPL-SCNC: 22 MMOL/L (ref 20–29)
CREAT SERPL-MCNC: 1.14 MG/DL (ref 0.76–1.27)
EOSINOPHIL # BLD AUTO: 0.4 X10E3/UL (ref 0–0.4)
EOSINOPHIL NFR BLD AUTO: 7 %
ERYTHROCYTE [DISTWIDTH] IN BLOOD BY AUTOMATED COUNT: 12.7 % (ref 11.6–15.4)
GLOBULIN SER CALC-MCNC: 2.4 G/DL (ref 1.5–4.5)
GLUCOSE SERPL-MCNC: 92 MG/DL (ref 65–99)
HCT VFR BLD AUTO: 46.8 % (ref 37.5–51)
HGB BLD-MCNC: 15.8 G/DL (ref 13–17.7)
IMM GRANULOCYTES # BLD AUTO: 0 X10E3/UL (ref 0–0.1)
IMM GRANULOCYTES NFR BLD AUTO: 0 %
LIPASE SERPL-CCNC: 21 U/L (ref 13–78)
LYMPHOCYTES # BLD AUTO: 1.5 X10E3/UL (ref 0.7–3.1)
LYMPHOCYTES NFR BLD AUTO: 29 %
MCH RBC QN AUTO: 31 PG (ref 26.6–33)
MCHC RBC AUTO-ENTMCNC: 33.8 G/DL (ref 31.5–35.7)
MCV RBC AUTO: 92 FL (ref 79–97)
MONOCYTES # BLD AUTO: 0.7 X10E3/UL (ref 0.1–0.9)
MONOCYTES NFR BLD AUTO: 14 %
NEUTROPHILS # BLD AUTO: 2.5 X10E3/UL (ref 1.4–7)
NEUTROPHILS NFR BLD AUTO: 49 %
PLATELET # BLD AUTO: 211 X10E3/UL (ref 150–450)
POTASSIUM SERPL-SCNC: 4.9 MMOL/L (ref 3.5–5.2)
PROT SERPL-MCNC: 6.8 G/DL (ref 6–8.5)
RBC # BLD AUTO: 5.09 X10E6/UL (ref 4.14–5.8)
SODIUM SERPL-SCNC: 143 MMOL/L (ref 134–144)
WBC # BLD AUTO: 5 X10E3/UL (ref 3.4–10.8)

## 2022-02-08 ENCOUNTER — TELEPHONE (OUTPATIENT)
Dept: INTERNAL MEDICINE | Facility: CLINIC | Age: 52
End: 2022-02-08

## 2022-02-08 DIAGNOSIS — J45.30 MILD PERSISTENT ASTHMA, UNSPECIFIED WHETHER COMPLICATED: Primary | ICD-10-CM

## 2022-02-08 RX ORDER — FLUTICASONE FUROATE 100 UG/1
1 POWDER RESPIRATORY (INHALATION) DAILY
Qty: 90 EACH | Refills: 3 | Status: SHIPPED | OUTPATIENT
Start: 2022-02-08 | End: 2022-11-19 | Stop reason: SDUPTHER

## 2022-02-08 NOTE — TELEPHONE ENCOUNTER
122 is considered a very mild cholesterol elevation, I am okay with checking it at next physical but if we see him earlier for any other blood work we can get it then.

## 2022-02-08 NOTE — TELEPHONE ENCOUNTER
Pt sent message re: Pulmicort, Rx'd     I attempted PA through [employer].    Preferred alternatives not requiring PA, according to pt's plan:    Spiriva Respimat 1.25 Mcg/Act  Flovent  Mcg/Act  Arnuity Ellipta 100 Mcg/Act  Symbicort 160-4.5 Mcg/Act  Flovent Diskus 100 Mcg/Act  Breo Ellipta 100-25 Mcg/Dose    Please advise.

## 2022-02-08 NOTE — TELEPHONE ENCOUNTER
Pt had a question about recent labs.    He saw Crystal 1/28/2022 for his physical, but she didn't order lipids, just CBC, CMP, Lipase.    Last lipid panel was done 1/22/2021, normal except for elevated LDL (122).     He thought you would want to keep an eye on it, and is wondering if he should have this done.    Please advise.

## 2022-03-24 DIAGNOSIS — F90.9 ADULT ADHD: ICD-10-CM

## 2022-03-24 RX ORDER — DEXTROAMPHETAMINE SACCHARATE, AMPHETAMINE ASPARTATE, DEXTROAMPHETAMINE SULFATE AND AMPHETAMINE SULFATE 2.5; 2.5; 2.5; 2.5 MG/1; MG/1; MG/1; MG/1
10 TABLET ORAL 2 TIMES DAILY
Qty: 60 TABLET | Refills: 0 | Status: SHIPPED | OUTPATIENT
Start: 2022-03-24 | End: 2022-05-25 | Stop reason: SDUPTHER

## 2022-05-25 DIAGNOSIS — F90.9 ADULT ADHD: ICD-10-CM

## 2022-05-25 RX ORDER — DEXTROAMPHETAMINE SACCHARATE, AMPHETAMINE ASPARTATE, DEXTROAMPHETAMINE SULFATE AND AMPHETAMINE SULFATE 2.5; 2.5; 2.5; 2.5 MG/1; MG/1; MG/1; MG/1
10 TABLET ORAL 2 TIMES DAILY
Qty: 60 TABLET | Refills: 0 | Status: SHIPPED | OUTPATIENT
Start: 2022-05-25 | End: 2023-01-04 | Stop reason: SDUPTHER

## 2022-05-25 NOTE — TELEPHONE ENCOUNTER
Rx Refill Note  Requested Prescriptions     Pending Prescriptions Disp Refills   • amphetamine-dextroamphetamine (Adderall) 10 MG tablet 60 tablet 0     Sig: Take 1 tablet by mouth 2 (Two) Times a Day.      Last office visit with prescribing clinician: 7/20/2021      Next office visit with prescribing clinician: Visit date not found     Pt cancelled appt 4/29/2022  UDS NEEDS UPDATED  CONTRACT NEEDS UPDATED   ToxASSURE Select 13 (MW) - Urine, Clean Catch (01/22/2021 07:23)      Benita Smith LPN  05/25/22, 10:15 EDT

## 2022-11-19 DIAGNOSIS — J45.30 MILD PERSISTENT ASTHMA, UNSPECIFIED WHETHER COMPLICATED: ICD-10-CM

## 2022-11-21 RX ORDER — FLUTICASONE FUROATE 100 UG/1
1 POWDER RESPIRATORY (INHALATION) DAILY
Qty: 90 EACH | Refills: 3 | Status: SHIPPED | OUTPATIENT
Start: 2022-11-21 | End: 2023-02-24

## 2022-11-21 NOTE — TELEPHONE ENCOUNTER
Rx Refill Note  Requested Prescriptions     Pending Prescriptions Disp Refills   • Fluticasone Furoate (Arnuity Ellipta) 100 MCG/ACT aerosol powder  90 each 3     Sig: Inhale 1 puff Daily.      Last office visit with prescribing clinician: 7/20/2021      Next office visit with prescribing clinician: 2/24/2023            Melida Bell MA  11/21/22, 08:04 EST

## 2023-01-04 DIAGNOSIS — F90.9 ADULT ADHD: ICD-10-CM

## 2023-01-04 NOTE — TELEPHONE ENCOUNTER
Rx Refill Note  Requested Prescriptions     Pending Prescriptions Disp Refills   • amphetamine-dextroamphetamine (Adderall) 10 MG tablet 60 tablet 0     Sig: Take 1 tablet by mouth 2 (Two) Times a Day. Need appt for further refills.      Last office visit with prescribing clinician: 7/20/2021   Last telemedicine visit with prescribing clinician: 2/24/2023   Next office visit with prescribing clinician: 2/24/2023          ToxASSURE Select 13 (MW) - Urine, Clean Catch (01/22/2021 07:23)                   Would you like a call back once the refill request has been completed: [] Yes [] No    If the office needs to give you a call back, can they leave a voicemail: [] Yes [] No    Melida Bell MA  01/04/23, 14:05 EST

## 2023-01-05 RX ORDER — DEXTROAMPHETAMINE SACCHARATE, AMPHETAMINE ASPARTATE, DEXTROAMPHETAMINE SULFATE AND AMPHETAMINE SULFATE 2.5; 2.5; 2.5; 2.5 MG/1; MG/1; MG/1; MG/1
10 TABLET ORAL 2 TIMES DAILY
Qty: 60 TABLET | Refills: 0 | Status: SHIPPED | OUTPATIENT
Start: 2023-01-05 | End: 2023-01-09 | Stop reason: SDUPTHER

## 2023-01-09 DIAGNOSIS — F90.9 ADULT ADHD: ICD-10-CM

## 2023-01-09 RX ORDER — DEXTROAMPHETAMINE SACCHARATE, AMPHETAMINE ASPARTATE, DEXTROAMPHETAMINE SULFATE AND AMPHETAMINE SULFATE 2.5; 2.5; 2.5; 2.5 MG/1; MG/1; MG/1; MG/1
10 TABLET ORAL 2 TIMES DAILY
Qty: 60 TABLET | Refills: 0 | Status: SHIPPED | OUTPATIENT
Start: 2023-01-09 | End: 2023-02-14 | Stop reason: SDUPTHER

## 2023-01-09 NOTE — TELEPHONE ENCOUNTER
Rx Refill Note  Requested Prescriptions     Pending Prescriptions Disp Refills   • amphetamine-dextroamphetamine (Adderall) 10 MG tablet 60 tablet 0     Sig: Take 1 tablet by mouth 2 (Two) Times a Day. Need appt for further refills.      Last office visit with prescribing clinician: 7/20/2021   Last telemedicine visit with prescribing clinician: 2/24/2023   Next office visit with prescribing clinician: 2/24/2023          ToxASSURE Select 13 (MW) - Urine, Clean Catch (01/22/2021 07:23)                   Would you like a call back once the refill request has been completed: [] Yes [] No    If the office needs to give you a call back, can they leave a voicemail: [] Yes [] No    Melida Bell MA  01/09/23, 14:55 EST

## 2023-01-10 ENCOUNTER — TELEPHONE (OUTPATIENT)
Dept: INTERNAL MEDICINE | Facility: CLINIC | Age: 53
End: 2023-01-10
Payer: COMMERCIAL

## 2023-01-10 NOTE — TELEPHONE ENCOUNTER
Caller: Alphonso Myers    Relationship: Self    Best call back number: [phone]*    What is the best time to reach you: ANYTIME    Who are you requesting to speak with (clinical staff, provider,  specific staff member): CHARISMA    What was the call regarding: PATIENT WANTED TO LET CHARISMA KNOW THAT HE GOT HIS ADDERALL REFILLED AND IT HAS BEEN TAKEN CARE OF.    Do you require a callback: NO

## 2023-02-14 DIAGNOSIS — F90.9 ADULT ADHD: ICD-10-CM

## 2023-02-14 RX ORDER — DEXTROAMPHETAMINE SACCHARATE, AMPHETAMINE ASPARTATE, DEXTROAMPHETAMINE SULFATE AND AMPHETAMINE SULFATE 2.5; 2.5; 2.5; 2.5 MG/1; MG/1; MG/1; MG/1
10 TABLET ORAL 2 TIMES DAILY
Qty: 60 TABLET | Refills: 0 | Status: SHIPPED | OUTPATIENT
Start: 2023-02-14 | End: 2023-02-23 | Stop reason: SDUPTHER

## 2023-02-23 ENCOUNTER — TELEPHONE (OUTPATIENT)
Dept: INTERNAL MEDICINE | Facility: CLINIC | Age: 53
End: 2023-02-23
Payer: COMMERCIAL

## 2023-02-23 DIAGNOSIS — F90.9 ADULT ADHD: ICD-10-CM

## 2023-02-23 RX ORDER — DEXTROAMPHETAMINE SACCHARATE, AMPHETAMINE ASPARTATE, DEXTROAMPHETAMINE SULFATE AND AMPHETAMINE SULFATE 2.5; 2.5; 2.5; 2.5 MG/1; MG/1; MG/1; MG/1
10 TABLET ORAL 2 TIMES DAILY
Qty: 60 TABLET | Refills: 0 | Status: SHIPPED | OUTPATIENT
Start: 2023-02-23 | End: 2023-03-03 | Stop reason: SDUPTHER

## 2023-02-23 NOTE — TELEPHONE ENCOUNTER
Caller: Alphonso Myers    Relationship: Self    Best call back number: [phone]    What medications are you currently taking:   Current Outpatient Medications on File Prior to Visit   Medication Sig Dispense Refill   • albuterol sulfate  (90 Base) MCG/ACT inhaler Inhale 2 puffs Every 4 (Four) Hours As Needed for Wheezing. 6.7 g 3   • amphetamine-dextroamphetamine (Adderall) 10 MG tablet Take 1 tablet by mouth 2 (Two) Times a Day. 60 tablet 0   • fluticasone (Flonase) 50 MCG/ACT nasal spray 2 sprays into the nostril(s) as directed by provider Daily. 11.1 mL 3   • Fluticasone Furoate (Arnuity Ellipta) 100 MCG/ACT aerosol powder  Inhale 1 puff Daily. 90 each 3     No current facility-administered medications on file prior to visit.          When did you start taking these medications: ONGOING     Which medication are you concerned about: ADDERALL     Who prescribed you this medication: DR WHITNEY     What are your concerns: PHARMACY STATES THEY DID NOT RECEIVE REFILL REQUEST FOR ADDERALL THAT WAS SENT ON 2/14/23-[facility]  [facility]    How long have you had these concerns: SINCE 2/14

## 2023-02-24 ENCOUNTER — OFFICE VISIT (OUTPATIENT)
Dept: INTERNAL MEDICINE | Facility: CLINIC | Age: 53
End: 2023-02-24
Payer: COMMERCIAL

## 2023-02-24 VITALS
SYSTOLIC BLOOD PRESSURE: 112 MMHG | OXYGEN SATURATION: 98 % | WEIGHT: 214.6 LBS | HEART RATE: 68 BPM | BODY MASS INDEX: 26.13 KG/M2 | DIASTOLIC BLOOD PRESSURE: 74 MMHG | TEMPERATURE: 96.8 F | HEIGHT: 76 IN

## 2023-02-24 DIAGNOSIS — F90.9 ADULT ADHD: ICD-10-CM

## 2023-02-24 DIAGNOSIS — G89.29 CHRONIC PAIN OF RIGHT KNEE: ICD-10-CM

## 2023-02-24 DIAGNOSIS — J45.20 MILD INTERMITTENT ASTHMA WITHOUT COMPLICATION: ICD-10-CM

## 2023-02-24 DIAGNOSIS — E78.5 HYPERLIPIDEMIA, UNSPECIFIED HYPERLIPIDEMIA TYPE: ICD-10-CM

## 2023-02-24 DIAGNOSIS — Z13.1 SCREENING FOR DIABETES MELLITUS: ICD-10-CM

## 2023-02-24 DIAGNOSIS — M75.91 RIGHT SUPRASPINATUS TENDINITIS: ICD-10-CM

## 2023-02-24 DIAGNOSIS — Z98.890 STATUS POST LABRAL REPAIR OF SHOULDER: ICD-10-CM

## 2023-02-24 DIAGNOSIS — Z00.00 ANNUAL PHYSICAL EXAM: Primary | ICD-10-CM

## 2023-02-24 DIAGNOSIS — Z13.89 SCREENING FOR BLOOD OR PROTEIN IN URINE: ICD-10-CM

## 2023-02-24 DIAGNOSIS — M25.561 CHRONIC PAIN OF RIGHT KNEE: ICD-10-CM

## 2023-02-24 PROBLEM — Z80.0 FAMILY HISTORY OF COLON CANCER: Status: ACTIVE | Noted: 2023-02-24

## 2023-02-24 PROBLEM — R06.2 WHEEZING: Status: RESOLVED | Noted: 2021-01-19 | Resolved: 2023-02-24

## 2023-02-24 PROCEDURE — 99396 PREV VISIT EST AGE 40-64: CPT | Performed by: FAMILY MEDICINE

## 2023-02-24 PROCEDURE — 93000 ELECTROCARDIOGRAM COMPLETE: CPT | Performed by: FAMILY MEDICINE

## 2023-02-24 NOTE — PROGRESS NOTES
Date of Encounter: 2023  Patient: Alphonso Myers,  1970    Subjective   History of Presenting Illness  Chief complaint: Annual physical    ADHD, using Adderall 10 mg twice daily usually Monday through Thursday for work related difficulties with concentration.  No side effects on this medication.  Feels it helps his work function significantly.    Hyperlipidemia with prior 10-year ASCVD less than 3%.    Mild intermittent asthma, over the past year he has had 0 bronchitis episodes and no acute exacerbations.  He is not currently using his ICS any longer, although he has used it during 1-2 episodes of chest tightness for a few days.  No recent albuterol use.  No significant allergy symptoms    Chronic right shoulder pain for about 8 months, no known injury.  Pain felt anterior and posteriorly, felt during end range of motion particularly when reaching behind. not causing significant functional limitations.    Right knee pain, chronic, occasional swelling and mechanical symptoms.  Takes Advil as needed.  This has been present for several years.  No history of any imaging.  This does limit his ability to do exercise.    Lives with wife, 2 children ages 16 and 13.  Never smoker.  3 alcoholic drinks per week. Plays golf, not otherwise exercising due to knee pain, has pelaton.  Brother diagnosed with colon cancer at age 45, stage IV. works in sales making packaging equipment.  Wife is a .  Does not have a living will. Up-to-date on all vaccinations with the exception of Shingrix and PCV 20 which she declines today.    Review of Systems:  Negative for fever, congestion, chest pain upon exertion, shortness of breath, vision changes, vomiting, dysuria, lymphadenopathy, muscle weakness, numbness, mood changes, rashes.    The following portions of the patient's history were reviewed and updated as appropriate: allergies, current medications, past family history, past medical history, past  social history, past surgical history and problem list.    Patient Active Problem List   Diagnosis   • Status post labral repair of shoulder, left   • Adult ADHD   • Hyperlipidemia   • Mild persistent asthma     Past Medical History:   Diagnosis Date   • ADHD (attention deficit hyperactivity disorder)    • Bankart lesion of left shoulder    • Colon polyp    • Dislocation of left shoulder joint    • Status post labral repair of shoulder    • Tear of left glenoid labrum      Past Surgical History:   Procedure Laterality Date   • COLONOSCOPY     • SHOULDER ARTHROSCOPY Left 02/26/2019    Left shoulder arthroscopy with extensive labral repair including capsulorrhaphy, reduction and fixation glenoid fracture, SLAP repair as well as chondroplasty       Family History   Problem Relation Age of Onset   • Diabetes Father    • Cancer Maternal Grandfather         Colon Cancer   • Diabetes Paternal Grandfather        Current Outpatient Medications:   •  albuterol sulfate  (90 Base) MCG/ACT inhaler, Inhale 2 puffs Every 4 (Four) Hours As Needed for Wheezing., Disp: 6.7 g, Rfl: 3  •  amphetamine-dextroamphetamine (Adderall) 10 MG tablet, Take 1 tablet by mouth 2 (Two) Times a Day., Disp: 60 tablet, Rfl: 0  •  fluticasone (Flonase) 50 MCG/ACT nasal spray, 2 sprays into the nostril(s) as directed by provider Daily., Disp: 11.1 mL, Rfl: 3  •  Fluticasone Furoate (Arnuity Ellipta) 100 MCG/ACT aerosol powder , Inhale 1 puff Daily., Disp: 90 each, Rfl: 3  No Known Allergies  Social History     Tobacco Use   • Smoking status: Never   • Smokeless tobacco: Never   Substance Use Topics   • Alcohol use: Yes     Alcohol/week: 6.0 standard drinks     Types: 6 Glasses of wine per week   • Drug use: Never          Objective   Physical Exam  There were no vitals filed for this visit.  There is no height or weight on file to calculate BMI.    Constitutional: NAD.  Eyes: EOMI. PERRLA. Normal conjunctiva.  Ear, nose, mouth, throat: No  tonsillar exudates or erythema.   Normal nasal mucosa. Normal external ear canals and TMs bilaterally.  Cardiovascular: RRR. No murmurs. No LE edema b/l. Radial pulses 2+ bilaterally.  Pulmonary: CTA b/l. Good effort.  Integumentary: No rashes or wounds on face or upper extremities.  Lymphatic: No anterior cervical lymphadenopathy.  Endocrine: No thyromegaly or palpable thyroid nodules.  Psychiatric: Normal affect. Normal thought content.  Gastrointestinal: Nondistended. No hepatosplenomegaly. No focal tenderness to palpation. Normal bowel sounds.  Right shoulder: Normal range of motion and strength.  Pain with empty can test. mildly positive Mcdaniel test.  Negative Neer, scarf, [place], Speed.  No tenderness to palpation of the shoulder.  Right knee: No appreciable effusion.  No joint line tenderness.  Normal strength and range of motion.  Normal anterior and posterior drawer test, medial and lateral collateral ligament test, Margi, Thessaly test.       Assessment & Plan   Assessment and Plan  Pleasant 52-year-old male with ADHD, hyperlipidemia, mild intermittent asthma, chronic right knee pain who presents for the following:    Diagnoses and all orders for this visit:    1. Annual physical exam (Primary): We discussed preventative care including age and patient-appropriate immunizations and cancer screening. We also discussed the importance of exercise and a healthy diet. This is their annual preventative exam.    2. Adult ADHD: David is appropriate.  UDS today.  Tolerating well functionally.  EKG borderline left axis deviation mild bradycardia, otherwise unremarkable.  Reviewed risks and benefits of this medication.  With his current fill history is long as he feels less than 3 times per year I am okay with following up in 1 year instead of 6 months.  -     Urine Drug Screen - Urine, Clean Catch  -     ECG 12 Lead    ECG 12 Lead    Date/Time: 2/24/2023 1:32 PM  Performed by: Don Blackmon MD  Authorized by:  Don Blackmon MD   Comparison: not compared with previous ECG   Previous ECG: no previous ECG available  Rhythm: sinus bradycardia  Rate: normal  Conduction: conduction normal  ST Segments: ST segments normal  T Waves: T waves normal  QRS axis: borderline LAD.  Other: no other findings    Clinical impression: non-specific ECG        3. Hyperlipidemia, unspecified hyperlipidemia type: No indication for primary prevention yet  -     Lipid Panel  -     Comprehensive Metabolic Panel  -     CBC & Differential  -     Thyroid Panel With TSH    4. Mild intermittent asthma without complication: Okay to stop ICS, discussed reasons to consider restarting.  Discussed potential benefits of PCV20 which he declines at this time    5. Chronic pain of right knee: X-ray for further evaluation, symptoms suspicious for meniscus injury but exam normal  -     XR Knee 3+ View With [place] Right    6. Right supraspinatus tendinitis: Exam consistent with right supraspinatus tendinitis, good strength and range of motion, okay to try home exercises which I provided for him, if not improving physical therapy    7. Status post labral repair of shoulder, left: No persistent symptoms    8. Screening for blood or protein in urine  -     Urinalysis With Microscopic - Urine, Clean Catch    9. Screening for diabetes mellitus  -     Hemoglobin A1c    Return to office in 1 year for annual physical or earlier as needed.    Don Blackmon MD  Family Medicine  O: [phone]    Disclaimer: Parts of this note were dictated by speech recognition. Minor errors in transcription may be present. Please call if questions.

## 2023-02-27 LAB
ALBUMIN SERPL-MCNC: 4.9 G/DL (ref 4–5)
ALBUMIN/GLOB SERPL: 2.5 {RATIO} (ref 1.2–2.2)
ALP SERPL-CCNC: 49 IU/L (ref 44–121)
ALT SERPL-CCNC: 36 IU/L (ref 0–44)
AMPHETAMINES UR QL SCN: NEGATIVE NG/ML
APPEARANCE UR: CLEAR
AST SERPL-CCNC: 20 IU/L (ref 0–40)
BACTERIA #/AREA URNS HPF: NORMAL /[HPF]
BARBITURATES UR QL SCN: NEGATIVE NG/ML
BASOPHILS # BLD AUTO: 0 X10E3/UL (ref 0–0.2)
BASOPHILS NFR BLD AUTO: 0 %
BENZODIAZ UR QL SCN: NEGATIVE NG/ML
BILIRUB SERPL-MCNC: 0.6 MG/DL (ref 0–1.2)
BILIRUB UR QL STRIP: NEGATIVE
BUN SERPL-MCNC: 14 MG/DL (ref 6–24)
BUN/CREAT SERPL: 13 (ref 9–20)
BZE UR QL SCN: NEGATIVE NG/ML
CALCIUM SERPL-MCNC: 9.5 MG/DL (ref 8.7–10.2)
CANNABINOIDS UR QL SCN: NEGATIVE NG/ML
CASTS URNS QL MICRO: NORMAL /LPF
CHLORIDE SERPL-SCNC: 101 MMOL/L (ref 96–106)
CHOLEST SERPL-MCNC: 237 MG/DL (ref 100–199)
CO2 SERPL-SCNC: 22 MMOL/L (ref 20–29)
COLOR UR: YELLOW
CREAT SERPL-MCNC: 1.04 MG/DL (ref 0.76–1.27)
CREAT UR-MCNC: 90.3 MG/DL (ref 20–300)
EGFRCR SERPLBLD CKD-EPI 2021: 87 ML/MIN/1.73
EOSINOPHIL # BLD AUTO: 0.2 X10E3/UL (ref 0–0.4)
EOSINOPHIL NFR BLD AUTO: 4 %
EPI CELLS #/AREA URNS HPF: NORMAL /HPF (ref 0–10)
ERYTHROCYTE [DISTWIDTH] IN BLOOD BY AUTOMATED COUNT: 12.4 % (ref 11.6–15.4)
FT4I SERPL CALC-MCNC: 1.8 (ref 1.2–4.9)
GLOBULIN SER CALC-MCNC: 2 G/DL (ref 1.5–4.5)
GLUCOSE SERPL-MCNC: 88 MG/DL (ref 70–99)
GLUCOSE UR QL STRIP: NEGATIVE
HBA1C MFR BLD: 5.4 % (ref 4.8–5.6)
HCT VFR BLD AUTO: 44.5 % (ref 37.5–51)
HDLC SERPL-MCNC: 59 MG/DL
HGB BLD-MCNC: 14.9 G/DL (ref 13–17.7)
HGB UR QL STRIP: NEGATIVE
IMM GRANULOCYTES # BLD AUTO: 0 X10E3/UL (ref 0–0.1)
IMM GRANULOCYTES NFR BLD AUTO: 0 %
KETONES UR QL STRIP: NEGATIVE
LABORATORY COMMENT REPORT: NORMAL
LDLC SERPL CALC-MCNC: 166 MG/DL (ref 0–99)
LEUKOCYTE ESTERASE UR QL STRIP: NEGATIVE
LYMPHOCYTES # BLD AUTO: 1.4 X10E3/UL (ref 0.7–3.1)
LYMPHOCYTES NFR BLD AUTO: 25 %
MCH RBC QN AUTO: 30.8 PG (ref 26.6–33)
MCHC RBC AUTO-ENTMCNC: 33.5 G/DL (ref 31.5–35.7)
MCV RBC AUTO: 92 FL (ref 79–97)
METHADONE UR QL SCN: NEGATIVE NG/ML
MICRO URNS: NORMAL
MICRO URNS: NORMAL
MONOCYTES # BLD AUTO: 0.7 X10E3/UL (ref 0.1–0.9)
MONOCYTES NFR BLD AUTO: 12 %
NEUTROPHILS # BLD AUTO: 3.4 X10E3/UL (ref 1.4–7)
NEUTROPHILS NFR BLD AUTO: 59 %
NITRITE UR QL STRIP: NEGATIVE
OPIATES UR QL SCN: NEGATIVE NG/ML
OXYCODONE+OXYMORPHONE UR QL SCN: NEGATIVE NG/ML
PCP UR QL: NEGATIVE NG/ML
PH UR STRIP: 6.5 [PH] (ref 5–7.5)
PH UR: 6.4 [PH] (ref 4.5–8.9)
PLATELET # BLD AUTO: 236 X10E3/UL (ref 150–450)
POTASSIUM SERPL-SCNC: 3.9 MMOL/L (ref 3.5–5.2)
PROPOXYPH UR QL SCN: NEGATIVE NG/ML
PROT SERPL-MCNC: 6.9 G/DL (ref 6–8.5)
PROT UR QL STRIP: NEGATIVE
RBC # BLD AUTO: 4.84 X10E6/UL (ref 4.14–5.8)
RBC #/AREA URNS HPF: NORMAL /HPF (ref 0–2)
SODIUM SERPL-SCNC: 139 MMOL/L (ref 134–144)
SP GR UR STRIP: 1.01 (ref 1–1.03)
T3RU NFR SERPL: 28 % (ref 24–39)
T4 SERPL-MCNC: 6.6 UG/DL (ref 4.5–12)
TRIGL SERPL-MCNC: 71 MG/DL (ref 0–149)
TSH SERPL DL<=0.005 MIU/L-ACNC: 1.76 UIU/ML (ref 0.45–4.5)
UROBILINOGEN UR STRIP-MCNC: 0.2 MG/DL (ref 0.2–1)
VLDLC SERPL CALC-MCNC: 12 MG/DL (ref 5–40)
WBC # BLD AUTO: 5.8 X10E3/UL (ref 3.4–10.8)
WBC #/AREA URNS HPF: NORMAL /HPF (ref 0–5)

## 2023-02-28 NOTE — PROGRESS NOTES
Your cholesterol has worsened substantially from last year, but otherwise the rest of your blood work looks good.  Your cholesterol is not severe enough to need a medication at this time.  Continue to try to minimize animal fats and red meat in your diet.

## 2023-03-03 DIAGNOSIS — F90.9 ADULT ADHD: ICD-10-CM

## 2023-03-03 RX ORDER — DEXTROAMPHETAMINE SACCHARATE, AMPHETAMINE ASPARTATE, DEXTROAMPHETAMINE SULFATE AND AMPHETAMINE SULFATE 2.5; 2.5; 2.5; 2.5 MG/1; MG/1; MG/1; MG/1
10 TABLET ORAL 2 TIMES DAILY
Qty: 60 TABLET | Refills: 0 | Status: SHIPPED | OUTPATIENT
Start: 2023-03-03

## 2023-03-03 NOTE — TELEPHONE ENCOUNTER
Caller: Keila Myerssh    Relationship: Self    Best call back number: [phone]    Requested Prescriptions:   Requested Prescriptions     Pending Prescriptions Disp Refills   • amphetamine-dextroamphetamine (Adderall) 10 MG tablet 60 tablet 0     Sig: Take 1 tablet by mouth 2 (Two) Times a Day.        Pharmacy where request should be sent: [id number] - [place], [place] - [address].  [phone]  - [phone] FX     Additional details provided by patient: PATIENT STATES THAT HIS REGULAR PHARMACY HAS THE MEDICATION ON BACK ORDER. HE HAS BEEN OUT FOR 2 WEEKS, SO REQUESTS TO HAVE PREVIOUS PRESCRIPTION CANCELLED, AND SENT TO [facility] INSTEAD    Does the patient have less than a 3 day supply:  [x] Yes  [] No    Would you like a call back once the refill request has been completed: [] Yes [x] No    If the office needs to give you a call back, can they leave a voicemail: [] Yes [x] No    Dino Reyes Rep   03/03/23 16:19 EST

## 2023-03-06 ENCOUNTER — PATIENT MESSAGE (OUTPATIENT)
Dept: INTERNAL MEDICINE | Facility: CLINIC | Age: 53
End: 2023-03-06
Payer: COMMERCIAL

## 2023-03-06 DIAGNOSIS — F90.9 ADULT ADHD: ICD-10-CM

## 2023-03-06 RX ORDER — DEXTROAMPHETAMINE SACCHARATE, AMPHETAMINE ASPARTATE, DEXTROAMPHETAMINE SULFATE AND AMPHETAMINE SULFATE 5; 5; 5; 5 MG/1; MG/1; MG/1; MG/1
10 TABLET ORAL 2 TIMES DAILY
Qty: 30 TABLET | Refills: 0 | Status: SHIPPED | OUTPATIENT
Start: 2023-03-06

## 2023-03-07 NOTE — TELEPHONE ENCOUNTER
From: Alphonso Myers  To: Don Blackmon  Sent: 3/6/2023 12:07 PM EST  Subject: Adderall    Good afternoon, there seems to be an Adderal shortage and I switched from [facility] to [facility] which is where you approved my refill last Friday. However, [facility] didn't have the 10mg dose but they do have the 20mg dose. Could you please send them a new prescription for the 20mg dose (quantity 30)?     Thanks,   Alphonso Myers

## 2023-04-14 DIAGNOSIS — F90.9 ADULT ADHD: ICD-10-CM

## 2023-04-14 RX ORDER — DEXTROAMPHETAMINE SACCHARATE, AMPHETAMINE ASPARTATE, DEXTROAMPHETAMINE SULFATE AND AMPHETAMINE SULFATE 5; 5; 5; 5 MG/1; MG/1; MG/1; MG/1
10 TABLET ORAL 2 TIMES DAILY
Qty: 30 TABLET | Refills: 0 | Status: SHIPPED | OUTPATIENT
Start: 2023-04-14

## 2023-04-14 NOTE — TELEPHONE ENCOUNTER
Rx Refill Note  Requested Prescriptions     Pending Prescriptions Disp Refills   • amphetamine-dextroamphetamine (Adderall) 20 MG tablet 30 tablet 0     Sig: Take 0.5 tablets by mouth 2 (Two) Times a Day.      Last office visit with prescribing clinician: 2/24/2023   Last telemedicine visit with prescribing clinician: Visit date not found   Next office visit with prescribing clinician: 3/1/2024          Urine Drug Screen - Urine, Clean Catch (02/24/2023 13:14)                   Would you like a call back once the refill request has been completed: [] Yes [] No    If the office needs to give you a call back, can they leave a voicemail: [] Yes [] No    Melida Bell MA  04/14/23, 09:14 EDT

## 2024-02-29 ENCOUNTER — TELEPHONE (OUTPATIENT)
Dept: GASTROENTEROLOGY | Facility: CLINIC | Age: 54
End: 2024-02-29
Payer: COMMERCIAL

## 2024-02-29 NOTE — TELEPHONE ENCOUNTER
Caller: Alphonso Myers    Relationship to patient: Self    Best call back number: [phone]     Patient is needing: PATIENT HAS RECEIVED HIS COLON RECALL LETTER AND WOULD LIKE TO SCHEDULE.  PLEASE CALL BACK TO SCHEDULE.

## 2024-03-01 ENCOUNTER — OFFICE VISIT (OUTPATIENT)
Dept: INTERNAL MEDICINE | Facility: CLINIC | Age: 54
End: 2024-03-01
Payer: COMMERCIAL

## 2024-03-01 VITALS
HEART RATE: 61 BPM | OXYGEN SATURATION: 98 % | DIASTOLIC BLOOD PRESSURE: 74 MMHG | WEIGHT: 217 LBS | BODY MASS INDEX: 26.42 KG/M2 | SYSTOLIC BLOOD PRESSURE: 114 MMHG | TEMPERATURE: 97.4 F | HEIGHT: 76 IN

## 2024-03-01 DIAGNOSIS — F90.9 ADULT ADHD: ICD-10-CM

## 2024-03-01 DIAGNOSIS — D36.9 MULTIPLE ADENOMATOUS POLYPS: ICD-10-CM

## 2024-03-01 DIAGNOSIS — Z80.0 FAMILY HISTORY OF COLON CANCER: ICD-10-CM

## 2024-03-01 DIAGNOSIS — Z13.1 SCREENING FOR DIABETES MELLITUS: ICD-10-CM

## 2024-03-01 DIAGNOSIS — E78.5 HYPERLIPIDEMIA, UNSPECIFIED HYPERLIPIDEMIA TYPE: ICD-10-CM

## 2024-03-01 DIAGNOSIS — Z12.12 ENCOUNTER FOR SCREENING FOR COLORECTAL MALIGNANT NEOPLASM: ICD-10-CM

## 2024-03-01 DIAGNOSIS — Z13.89 SCREENING FOR BLOOD OR PROTEIN IN URINE: ICD-10-CM

## 2024-03-01 DIAGNOSIS — J45.20 MILD INTERMITTENT ASTHMA WITHOUT COMPLICATION: ICD-10-CM

## 2024-03-01 DIAGNOSIS — M25.511 CHRONIC PAIN OF BOTH SHOULDERS: ICD-10-CM

## 2024-03-01 DIAGNOSIS — Z00.00 ANNUAL PHYSICAL EXAM: Primary | ICD-10-CM

## 2024-03-01 DIAGNOSIS — G89.29 CHRONIC PAIN OF BOTH SHOULDERS: ICD-10-CM

## 2024-03-01 DIAGNOSIS — Z12.11 ENCOUNTER FOR SCREENING FOR COLORECTAL MALIGNANT NEOPLASM: ICD-10-CM

## 2024-03-01 DIAGNOSIS — Z12.5 SCREENING FOR MALIGNANT NEOPLASM OF PROSTATE: ICD-10-CM

## 2024-03-01 DIAGNOSIS — G57.11 MERALGIA PARAESTHETICA, RIGHT: ICD-10-CM

## 2024-03-01 DIAGNOSIS — M25.512 CHRONIC PAIN OF BOTH SHOULDERS: ICD-10-CM

## 2024-03-01 RX ORDER — ALBUTEROL SULFATE 90 UG/1
2 AEROSOL, METERED RESPIRATORY (INHALATION) EVERY 4 HOURS PRN
Qty: 6.7 G | Refills: 3 | Status: SHIPPED | OUTPATIENT
Start: 2024-03-01

## 2024-03-01 RX ORDER — METHYLPHENIDATE HYDROCHLORIDE 27 MG/1
27 TABLET ORAL EVERY MORNING
Qty: 30 TABLET | Refills: 0 | Status: SHIPPED | OUTPATIENT
Start: 2024-03-01

## 2024-03-01 NOTE — PROGRESS NOTES
Date of Encounter: 2024  Patient: Alphonso Myers,  1970    Subjective   History of Presenting Illness  Chief complaint: Annual physical     Some intermittent numbness of the right lateral thigh, no weakness, not worsening, has never had this before.  Does not wear a tool belt.    Hyperlipidemia with no major lifestyle changes.    Adult ADHD, was having sensation of pulsing in his neck on Adderall.  His son has done well with Concerta and he is interested in trying that.  Usually needs medication when he is doing more computer-based work all day.    Moderate intermittent asthma mild remittent asthma with no recent episodes of bronchitis despite recent stepping down off of ICS, occasional wheezing, does not have an albuterol inhaler.      Brother passed  from colon cancer.  He was comfortable.  Patient got to be there with him.  Patient has a history of multiple polyps and due for colonoscopy this year with Dr. Hanson.    Still has some aching of the shoulders occasionally.  Tries to modify activities, status post labral tear on the left.     Lives with wife, 2 children ages 17 and 14.  Never smoker.  3 alcoholic drinks per week. Plays golf, uses a [employer] but cannot use this too long because of his knees, considering a rowing machine.  Last colonoscopy  with 3-year interval.  Works in sales making packaging equipment.  Wife is a .  Does not have a living will. Up-to-date on all vaccinations with the exception of Shingrix and PCV 20 which we discussed.    The following portions of the patient's history were reviewed and updated as appropriate: allergies, current medications, past family history, past medical history, past social history, past surgical history and problem list.    Patient Active Problem List   Diagnosis    Status post labral repair of shoulder, left    Adult ADHD    Hyperlipidemia    Mild intermittent asthma without complication    Family history of colon  "cancer in brother, age 45    Right supraspinatus tendinitis    Chronic pain of right knee    Multiple adenomatous polyps    Meralgia paraesthetica, right     Past Medical History:   Diagnosis Date    ADHD (attention deficit hyperactivity disorder)     Bankart lesion of left shoulder     Colon polyp     Dislocation of left shoulder joint     Status post labral repair of shoulder     Tear of left glenoid labrum      Past Surgical History:   Procedure Laterality Date    COLONOSCOPY      SHOULDER ARTHROSCOPY Left 02/26/2019    Left shoulder arthroscopy with extensive labral repair including capsulorrhaphy, reduction and fixation glenoid fracture, SLAP repair as well as chondroplasty       Family History   Problem Relation Age of Onset    Diabetes Father     Cancer Brother 45        Colon Cancer    Cancer Maternal Grandfather         Colon Cancer    Diabetes Paternal Grandfather        Current Outpatient Medications:     albuterol sulfate  (90 Base) MCG/ACT inhaler, Inhale 2 puffs Every 4 (Four) Hours As Needed for Wheezing., Disp: 6.7 g, Rfl: 3    methylphenidate (Concerta) 27 MG CR tablet, Take 1 tablet by mouth Every Morning, Disp: 30 tablet, Rfl: 0  No Known Allergies  Social History     Tobacco Use    Smoking status: Never    Smokeless tobacco: Never   Substance Use Topics    Alcohol use: Yes     Alcohol/week: 6.0 standard drinks of alcohol     Types: 6 Glasses of wine per week    Drug use: Never          Objective   Physical Exam  Vitals:    03/01/24 0737   BP: 114/74   BP Location: Left arm   Patient Position: Sitting   Cuff Size: Large Adult   Pulse: 61   Temp: 97.4 °F (36.3 °C)   TempSrc: Infrared   SpO2: 98%   Weight: 98.4 kg (217 lb)   Height: 193 cm (76\")     Body mass index is 26.41 kg/m².    Constitutional: NAD.  Eyes: EOMI. PERRLA. Normal conjunctiva.  Ear, nose, mouth, throat: No tonsillar exudates or erythema.   Normal nasal mucosa. Normal external ear canals and TMs bilaterally.  Cardiovascular: " RRR. No murmurs. No LE edema b/l. Radial pulses 2+ bilaterally.  Pulmonary: CTA b/l. Good effort.  Integumentary: No rashes or wounds on face or upper extremities.  Lymphatic: No anterior cervical lymphadenopathy.  Endocrine: No thyromegaly or palpable thyroid nodules.  Psychiatric: Normal affect. Normal thought content.  Gastrointestinal: Nondistended. No hepatosplenomegaly. No focal tenderness to palpation. Normal bowel sounds.       Assessment & Plan   Assessment and Plan  Pleasant 53-year-old male with hyperlipidemia, ADHD, mild intermittent asthma, family history of colon cancer at age less than 50 and brother and history of polyps, who presents with the following:    Diagnoses and all orders for this visit:    1. Annual physical exam (Primary): We discussed preventative care including age and patient-appropriate immunizations and cancer screening. We also discussed the importance of exercise and a healthy diet. This is their annual preventative exam.    2. Meralgia paraesthetica, right: Discussed preventative measures, reasons to follow-up for further evaluation    3. Hyperlipidemia, unspecified hyperlipidemia type: Minimal 10-year ASCVD  -     CBC & Differential  -     Comprehensive Metabolic Panel  -     Lipid Panel  -     Thyroid Panel With TSH    4. Adult ADHD: Discussed risks and benefits of methylphenidate.  UDS today, David appropriate.  -     methylphenidate (Concerta) 27 MG CR tablet; Take 1 tablet by mouth Every Morning  Dispense: 30 tablet; Refill: 0  -     Urine Drug Screen - Urine, Clean Catch    5. Mild intermittent asthma without complication: Stable off of ICS  -     albuterol sulfate  (90 Base) MCG/ACT inhaler; Inhale 2 puffs Every 4 (Four) Hours As Needed for Wheezing.  Dispense: 6.7 g; Refill: 3    6. Chronic pain of both shoulders: Joseph bursitis considerations    7. Encounter for screening for colorectal malignant neoplasm  -     Ambulatory Referral For Screening Colonoscopy  8.  Family history of colon cancer  -     Ambulatory Referral For Screening Colonoscopy  9. Multiple adenomatous polyps  -     Ambulatory Referral For Screening Colonoscopy    10. Screening for diabetes mellitus  -     Hemoglobin A1c    11. Screening for blood or protein in urine  -     Urinalysis With Microscopic - Urine, Clean Catch    12. Screening for malignant neoplasm of prostate  -     PSA Screen    BMI is >= 25 and <30. (Overweight) The following options were offered after discussion;: weight loss educational material (shared in after visit summary) and exercise counseling/recommendations    Return to office in 1 year for annual physical or earlier as needed.    Don Blackmon MD  Family Medicine  O: [phone]    Disclaimer: Parts of this note were dictated by speech recognition. Minor errors in transcription may be present. Please call if questions.

## 2024-03-04 LAB
ALBUMIN SERPL-MCNC: 4.8 G/DL (ref 3.8–4.9)
ALBUMIN/GLOB SERPL: 2.3 {RATIO} (ref 1.2–2.2)
ALP SERPL-CCNC: 50 IU/L (ref 44–121)
ALT SERPL-CCNC: 27 IU/L (ref 0–44)
AMPHETAMINES UR QL SCN: NEGATIVE NG/ML
APPEARANCE UR: CLEAR
AST SERPL-CCNC: 16 IU/L (ref 0–40)
BACTERIA #/AREA URNS HPF: NORMAL /[HPF]
BARBITURATES UR QL SCN: NEGATIVE NG/ML
BASOPHILS # BLD AUTO: 0 X10E3/UL (ref 0–0.2)
BASOPHILS NFR BLD AUTO: 0 %
BENZODIAZ UR QL SCN: NEGATIVE NG/ML
BILIRUB SERPL-MCNC: 0.5 MG/DL (ref 0–1.2)
BILIRUB UR QL STRIP: NEGATIVE
BUN SERPL-MCNC: 15 MG/DL (ref 6–24)
BUN/CREAT SERPL: 13 (ref 9–20)
BZE UR QL SCN: NEGATIVE NG/ML
CALCIUM SERPL-MCNC: 9.8 MG/DL (ref 8.7–10.2)
CANNABINOIDS UR QL SCN: POSITIVE NG/ML
CASTS URNS QL MICRO: NORMAL /LPF
CHLORIDE SERPL-SCNC: 104 MMOL/L (ref 96–106)
CHOLEST SERPL-MCNC: 240 MG/DL (ref 100–199)
CO2 SERPL-SCNC: 24 MMOL/L (ref 20–29)
COLOR UR: YELLOW
CREAT SERPL-MCNC: 1.14 MG/DL (ref 0.76–1.27)
CREAT UR-MCNC: 177.6 MG/DL (ref 20–300)
EGFRCR SERPLBLD CKD-EPI 2021: 78 ML/MIN/1.73
EOSINOPHIL # BLD AUTO: 0.3 X10E3/UL (ref 0–0.4)
EOSINOPHIL NFR BLD AUTO: 5 %
EPI CELLS #/AREA URNS HPF: NORMAL /HPF (ref 0–10)
ERYTHROCYTE [DISTWIDTH] IN BLOOD BY AUTOMATED COUNT: 12.6 % (ref 11.6–15.4)
FT4I SERPL CALC-MCNC: 2.3 (ref 1.2–4.9)
GLOBULIN SER CALC-MCNC: 2.1 G/DL (ref 1.5–4.5)
GLUCOSE SERPL-MCNC: 96 MG/DL (ref 70–99)
GLUCOSE UR QL STRIP: NEGATIVE
HBA1C MFR BLD: 5.3 % (ref 4.8–5.6)
HCT VFR BLD AUTO: 45.7 % (ref 37.5–51)
HDLC SERPL-MCNC: 55 MG/DL
HGB BLD-MCNC: 15.3 G/DL (ref 13–17.7)
HGB UR QL STRIP: NEGATIVE
IMM GRANULOCYTES # BLD AUTO: 0 X10E3/UL (ref 0–0.1)
IMM GRANULOCYTES NFR BLD AUTO: 0 %
KETONES UR QL STRIP: ABNORMAL
LABORATORY COMMENT REPORT: ABNORMAL
LDLC SERPL CALC-MCNC: 168 MG/DL (ref 0–99)
LEUKOCYTE ESTERASE UR QL STRIP: NEGATIVE
LYMPHOCYTES # BLD AUTO: 1.4 X10E3/UL (ref 0.7–3.1)
LYMPHOCYTES NFR BLD AUTO: 26 %
MCH RBC QN AUTO: 30.6 PG (ref 26.6–33)
MCHC RBC AUTO-ENTMCNC: 33.5 G/DL (ref 31.5–35.7)
MCV RBC AUTO: 91 FL (ref 79–97)
METHADONE UR QL SCN: NEGATIVE NG/ML
MICRO URNS: ABNORMAL
MICRO URNS: ABNORMAL
MONOCYTES # BLD AUTO: 0.7 X10E3/UL (ref 0.1–0.9)
MONOCYTES NFR BLD AUTO: 13 %
NEUTROPHILS # BLD AUTO: 2.9 X10E3/UL (ref 1.4–7)
NEUTROPHILS NFR BLD AUTO: 56 %
NITRITE UR QL STRIP: NEGATIVE
OPIATES UR QL SCN: NEGATIVE NG/ML
OXYCODONE+OXYMORPHONE UR QL SCN: NEGATIVE NG/ML
PCP UR QL: NEGATIVE NG/ML
PH UR STRIP: 5.5 [PH] (ref 5–7.5)
PH UR: 5.7 [PH] (ref 4.5–8.9)
PLATELET # BLD AUTO: 255 X10E3/UL (ref 150–450)
POTASSIUM SERPL-SCNC: 4.5 MMOL/L (ref 3.5–5.2)
PROPOXYPH UR QL SCN: NEGATIVE NG/ML
PROT SERPL-MCNC: 6.9 G/DL (ref 6–8.5)
PROT UR QL STRIP: NEGATIVE
PSA SERPL-MCNC: 0.4 NG/ML (ref 0–4)
RBC # BLD AUTO: 5 X10E6/UL (ref 4.14–5.8)
RBC #/AREA URNS HPF: NORMAL /HPF (ref 0–2)
SODIUM SERPL-SCNC: 142 MMOL/L (ref 134–144)
SP GR UR STRIP: 1.02 (ref 1–1.03)
T3RU NFR SERPL: 29 % (ref 24–39)
T4 SERPL-MCNC: 7.8 UG/DL (ref 4.5–12)
TRIGL SERPL-MCNC: 96 MG/DL (ref 0–149)
TSH SERPL DL<=0.005 MIU/L-ACNC: 2.19 UIU/ML (ref 0.45–4.5)
UROBILINOGEN UR STRIP-MCNC: 0.2 MG/DL (ref 0.2–1)
VLDLC SERPL CALC-MCNC: 17 MG/DL (ref 5–40)
WBC # BLD AUTO: 5.4 X10E3/UL (ref 3.4–10.8)
WBC #/AREA URNS HPF: NORMAL /HPF (ref 0–5)

## 2024-09-09 DIAGNOSIS — F90.9 ADULT ADHD: ICD-10-CM

## 2024-09-09 DIAGNOSIS — J45.20 MILD INTERMITTENT ASTHMA WITHOUT COMPLICATION: ICD-10-CM

## 2024-09-09 RX ORDER — METHYLPHENIDATE HYDROCHLORIDE 27 MG/1
27 TABLET ORAL EVERY MORNING
Qty: 30 TABLET | Refills: 0 | Status: SHIPPED | OUTPATIENT
Start: 2024-09-09

## 2024-09-09 RX ORDER — ALBUTEROL SULFATE 90 UG/1
2 AEROSOL, METERED RESPIRATORY (INHALATION) EVERY 4 HOURS PRN
Qty: 6.7 G | Refills: 3 | Status: SHIPPED | OUTPATIENT
Start: 2024-09-09

## 2024-10-15 ENCOUNTER — PREP FOR SURGERY (OUTPATIENT)
Dept: SURGERY | Facility: SURGERY CENTER | Age: 54
End: 2024-10-15
Payer: COMMERCIAL

## 2024-10-15 DIAGNOSIS — Z86.0100 HISTORY OF COLON POLYPS: ICD-10-CM

## 2024-10-15 DIAGNOSIS — Z80.0 FAMILY HISTORY OF COLON CANCER: Primary | ICD-10-CM

## 2024-10-15 DIAGNOSIS — Z12.11 ENCOUNTER FOR SCREENING FOR MALIGNANT NEOPLASM OF COLON: ICD-10-CM

## 2024-10-15 RX ORDER — SODIUM CHLORIDE 0.9 % (FLUSH) 0.9 %
10 SYRINGE (ML) INJECTION AS NEEDED
OUTPATIENT
Start: 2024-10-15

## 2024-10-15 RX ORDER — SODIUM CHLORIDE, SODIUM LACTATE, POTASSIUM CHLORIDE, CALCIUM CHLORIDE 600; 310; 30; 20 MG/100ML; MG/100ML; MG/100ML; MG/100ML
30 INJECTION, SOLUTION INTRAVENOUS CONTINUOUS PRN
OUTPATIENT
Start: 2024-10-15 | End: 2024-10-15

## 2024-10-15 RX ORDER — SODIUM CHLORIDE 0.9 % (FLUSH) 0.9 %
3 SYRINGE (ML) INJECTION EVERY 12 HOURS SCHEDULED
OUTPATIENT
Start: 2024-10-15

## 2024-10-23 ENCOUNTER — TELEPHONE (OUTPATIENT)
Dept: GASTROENTEROLOGY | Facility: CLINIC | Age: 54
End: 2024-10-23
Payer: COMMERCIAL

## 2024-10-23 NOTE — TELEPHONE ENCOUNTER
Hub staff attempted to follow warm transfer process and was unsuccessful     Caller: TARA SHETH    Relationship to patient: SELF    Best call back number: [phone]     Patient is needing:PT STATED THAT HIS PREP INSTRUCTIONS WAS SENT TO HIM IN LETTERS AND IT DISAPPEARED AND HE NEEDS THAT TO BE RESENT  COLONOSCOPY DATE 10/25/24 PLEASE ADVISE AND RESEND

## 2024-10-23 NOTE — TELEPHONE ENCOUNTER
Spoke to the patient   normal (ped)... In no apparent distress, appears well developed and well nourished, smiling, interactive appropriately

## 2024-10-25 ENCOUNTER — LAB REQUISITION (OUTPATIENT)
Dept: LAB | Facility: HOSPITAL | Age: 54
End: 2024-10-25
Payer: COMMERCIAL

## 2024-10-25 ENCOUNTER — OUTSIDE FACILITY SERVICE (OUTPATIENT)
Dept: GASTROENTEROLOGY | Facility: CLINIC | Age: 54
End: 2024-10-25
Payer: COMMERCIAL

## 2024-10-25 DIAGNOSIS — Z80.0 FAMILY HISTORY OF COLON CANCER: ICD-10-CM

## 2024-10-25 PROCEDURE — 88305 TISSUE EXAM BY PATHOLOGIST: CPT | Performed by: INTERNAL MEDICINE

## 2024-10-25 PROCEDURE — 45385 COLONOSCOPY W/LESION REMOVAL: CPT | Performed by: INTERNAL MEDICINE

## 2024-10-28 LAB
CYTO UR: NORMAL
LAB AP CASE REPORT: NORMAL
PATH REPORT.FINAL DX SPEC: NORMAL
PATH REPORT.GROSS SPEC: NORMAL

## 2024-11-11 ENCOUNTER — TELEPHONE (OUTPATIENT)
Dept: GASTROENTEROLOGY | Facility: CLINIC | Age: 54
End: 2024-11-11
Payer: COMMERCIAL

## 2024-11-11 NOTE — TELEPHONE ENCOUNTER
On 11/7/24, RN called patient to discuss his path results from his recent CLS with . Patient stated that his wife, Yulissa, had also had a CLS on the same day with , and that she had talked with  about their daughter, Hedy Myers, age 15, who is having GI issues (pain--excruciating at times, cramping, constipation). Patient said that his wife shared with  the problems their daughter is experiencing and their frustration with waiting until the end of Jan.2025 for her to be seen. Reportedly,  said he could see her in the office sooner than the end of Jan.2025, which is when she can be seen by the pediatric GI provider. Informed patient that  doesn't usually see 15-year-old patients, and that  is scheduling into Jan.2025, as well. Patient insisted that  had offered to see his daughter, so I told him I would follow up on that.    RN spoke with Karen, practice manager, who will talk with  regarding patient's request.    From : I do not recall that. 17 should still probably go to peds GI unless it is something simple like GERD.    RN attempted to call patient today 11/11/24 after hearing response from , but had to leave a detailed message on his personal voice mail; [facility] with any further questions. miladis

## 2025-03-14 ENCOUNTER — TELEPHONE (OUTPATIENT)
Dept: INTERNAL MEDICINE | Facility: CLINIC | Age: 55
End: 2025-03-14

## 2025-03-28 ENCOUNTER — OFFICE VISIT (OUTPATIENT)
Dept: INTERNAL MEDICINE | Facility: CLINIC | Age: 55
End: 2025-03-28
Payer: COMMERCIAL

## 2025-03-28 VITALS
WEIGHT: 212 LBS | OXYGEN SATURATION: 97 % | HEIGHT: 76 IN | SYSTOLIC BLOOD PRESSURE: 114 MMHG | HEART RATE: 79 BPM | BODY MASS INDEX: 25.82 KG/M2 | TEMPERATURE: 97 F | DIASTOLIC BLOOD PRESSURE: 74 MMHG

## 2025-03-28 DIAGNOSIS — Z13.1 SCREENING FOR DIABETES MELLITUS: Primary | ICD-10-CM

## 2025-03-28 DIAGNOSIS — Z12.5 SCREENING FOR MALIGNANT NEOPLASM OF PROSTATE: ICD-10-CM

## 2025-03-28 DIAGNOSIS — E78.5 HYPERLIPIDEMIA, UNSPECIFIED HYPERLIPIDEMIA TYPE: ICD-10-CM

## 2025-03-28 DIAGNOSIS — F90.9 ADULT ADHD: Primary | ICD-10-CM

## 2025-03-28 DIAGNOSIS — Z00.00 ROUTINE PHYSICAL EXAMINATION: ICD-10-CM

## 2025-03-28 DIAGNOSIS — Z76.89 ENCOUNTER TO ESTABLISH CARE: ICD-10-CM

## 2025-03-28 PROCEDURE — 99213 OFFICE O/P EST LOW 20 MIN: CPT | Performed by: NURSE PRACTITIONER

## 2025-03-28 NOTE — PROGRESS NOTES
"Chief Complaint  Establish Care    Subjective        Alphonso Myers presents to [facility]  History of Present Illness  He was last seen in our office for a routine physical exam on 03/01/2024. Has hyperlipidemia. Last lipid panel from 03/01/2024 showed stable, but elevated total cholesterol 240 with an LDL = 168. ADHD is well-controlled with methylphenidate. Has been having difficulty getting medication filled due to supply shortage and is not currently taking it. No other concerns today.       Objective   Vital Signs:  /74 (BP Location: Left arm, Patient Position: Sitting, Cuff Size: Adult)   Pulse 79   Temp 97 °F (36.1 °C) (Infrared)   Ht 193 cm (76\")   Wt 96.2 kg (212 lb)   SpO2 97%   BMI 25.81 kg/m²   Estimated body mass index is 25.81 kg/m² as calculated from the following:    Height as of this encounter: 193 cm (76\").    Weight as of this encounter: 96.2 kg (212 lb).       BMI is >= 25 and <30. (Overweight) The following options were offered after discussion;: not discussed at today's office visit.       Physical Exam  Vitals and nursing note reviewed.   Constitutional:       General: He is not in acute distress.     Appearance: Normal appearance. He is not ill-appearing, toxic-appearing or diaphoretic.   Neurological:      General: No focal deficit present.      Mental Status: He is alert and oriented to person, place, and time. Mental status is at baseline.   Psychiatric:         Mood and Affect: Mood normal.         Behavior: Behavior normal.         Thought Content: Thought content normal.         Judgment: Judgment normal.        Result Review :                   Assessment and Plan   Patient is a very pleasant 54-year-old male with hyperlipidemia, possibly familial,family history of colon cancer in brother (age 45), multiple adenomatous polyps, adult ADHD, chronic musculoskeletal issues, mild intermittent asthma without complication here to establish care while " Dr. Blackmon is off boarding from the office.          Diagnoses and all orders for this visit:    1. Adult ADHD (Primary)  -     Ambulatory Referral to Behavioral Health    2. Encounter to establish care    3. Hyperlipidemia, unspecified hyperlipidemia type  Comments:  Will check a fasting lipid panel as well as lipoprotein a and apolipoprotein b. Discuss findings a next routine physical exam.  Orders:  -     Apolipoprotein B; Future  -     Lipid Panel; Future  -     Lipoprotein A (LPA); Future             Follow Up   No follow-ups on file.  Patient was given instructions and counseling regarding his condition or for health maintenance advice. Please see specific information pulled into the AVS if appropriate.

## 2025-04-15 ENCOUNTER — RESULTS FOLLOW-UP (OUTPATIENT)
Dept: INTERNAL MEDICINE | Facility: CLINIC | Age: 55
End: 2025-04-15
Payer: COMMERCIAL

## 2025-04-28 ENCOUNTER — TELEMEDICINE (OUTPATIENT)
Dept: PSYCHIATRY | Facility: CLINIC | Age: 55
End: 2025-04-28
Payer: COMMERCIAL

## 2025-04-28 DIAGNOSIS — R41.840 IMPAIRED CONCENTRATION: Primary | Chronic | ICD-10-CM

## 2025-04-28 NOTE — PROGRESS NOTES
This provider is located at home office working remotely through the [facility] (through [facility]), [address], [place], [address] using a secure Online [employer] Video Visit through [employer]. Patient is being seen remotely via telehealth at their home address in [place], and stated they are in a secure environment for this session. The patient's condition being diagnosed/treated is appropriate for telemedicine. The provider identified herself as well as her credentials.   The patient, and/or patients guardian, consent to be seen remotely, and when consent is given they understand that the consent allows for patient identifiable information to be sent to a third party as needed.   They may refuse to be seen remotely at any time. The electronic data is encrypted and password protected, and the patient and/or guardian has been advised of the potential risks to privacy not withstanding such measures.    You have chosen to receive care through a telehealth visit.  Do you consent to use a video/audio connection for your medical care today? Yes    Patient identifiers utilized: Name and date of birth.    Patient verbally confirmed consent for today's encounter  04/28/2025 .    The patient does verbally confirm they are being seen today while physically located in the [facility].  This provider/this APRN is licensed in the [facility] where the patient is located/being seen.     Subjective   Alphonso Myers is a 54 y.o. male who presents today for initial evaluation     Chief Complaint: ADHD    Accompanied by: The patient is seen alone at today's encounter    History of Present Illness:   This is the first encounter for this patient with this APRN.  The patient reports most recently he was being treated by his primary care provider with Concerta for symptoms of ADHD.  He reports Concerta as previously effective without any medication side effects or  concerns.  The patient reports he has not taken any medication for ADHD in around a year, or longer.  He reports when he was being treated with Concerta there were some difficulties between the pharmacies and stimulant supply, and he was unable to get his medication.  He reports stopping medication due to difficulty getting the medication from the pharmacy, and he was hopeful that he would be able to manage his symptoms of ADHD without medication, but reports he has been struggling, and would like to resume treatment for history of ADHD/ADHD symptoms.  The patient reports for him he has probably suffered from symptoms of ADHD his entire life, but when he was younger there was not as much knowledge regarding ADHD as there is currently.  He reports he was not diagnosed with ADHD until around 2015 or 2016.  He reports it was his general practitioner at the time who went through some paperwork with him, and gave him the diagnosis of ADHD.  The patient reports at the time of the diagnosis he had previously been working in an office, in a structured environment, but had just started primarily working remotely from home, and this is when he noticed heightened symptoms of ADHD.  He reports he found it very difficult to stay on task.  He reports he was easily distracted.  He reported difficulties with focus/concentration.  He reports he would start numerous projects at one time, but was unable to complete the projects, and reports he would probably have 10 jobs going on at the same time with only approximately 50% completion of each project.  He reports his spouse notices his symptoms of ADHD and easy distractibility in him in the home environment, and will sometimes make comments to him regarding symptoms of ADHD, particularly regarding him being easily distracted.  He reports the symptoms of his untreated ADHD leaves him feeling more stressed and irritable.  He reports he is an individual that does not like to take a lot  "of medication, but he has found benefit from medication for symptoms of ADHD in the past, especially on Concerta, and is interested in resuming Concerta if possible.  During the encounter the patient also reports both of his children also are currently diagnosed and being treated for ADHD.   The patient reports his mood as \"old and tired\", but reports his mood is stable.  The patient reports some difficulties with sleep.  He reports over-the-counter medications such as Unisom he has found ineffective, but he does use OTC CBD to assist him with sleep.  He reports that OTC CBD helps him fall asleep at night, but he still has difficulty staying asleep.  He reports averaging 4 to 6 hours of sleep each night, and reports this has been his typical baseline for sleep for quite some time.  He denies any nightmares.  The patient denies any auditory or visual hallucinations, past or present.  He denies any history of bipolar or schizophrenia.  He does not endorse any symptoms significant for woodrow or psychosis, past or present.  He denies any self-injurious behaviors, past or present.  He denies any suicidal or homicidal ideations, plans, or intent at the time of today's encounter, and is convincing.   Using a shared decision making approach the patient requests evaluation and treatment for the patient's concern of ADHD.  To continue with future treatment for patient's concern of ADHD the patient will need further psychological testing through a Psychologist for more in-depth definitive testing and to rule out other differential diagnoses that could potentially be contributing to the patient's symptomatology, he will either need to provide results of previous testing, or undergo evaluation for this.  A referral for psychological testing for concern of ADHD has been placed at today's encounter with the patient's verbal consent.  The patient reports the office where he was previously diagnosed and treated for ADHD initially has " since closed, and he may have difficulty finding records, but he is going to look for records of past ADHD testing.  The patient reports if he finds past psychological testing requested he will provide those records to the office, and cancel his referral for psychological testing for ADHD, but if he cannot find these records he is agreeable to pursue confirmatory ADHD testing as discussed.  This APRN has also discussed with the patient after receiving records of psychological testing we will order an EKG for safety monitoring before resuming stimulant therapy as appropriate, and will also order a urine drug screen for safety monitoring and to be compliant with the [facility] controlled substance policy, but we will order these tests after obtaining psychological testing.  The patient has been advised to reach out to this office if there are any questions or concerns, and the office number has been provided ([phone]).  The patient is to follow-up with this APRN/office after completing psychological testing, or obtaining results of previous psychological testing, for review of those results and further diagnosis and treatment discussion, but he does report interest in resuming Concerta which he found previously effective after completing the recommendations, and we can order and obtain the EKG and urine drug screen discussed before next follow-up appointment, in order to resume Concerta sooner if appropriate, after review of appropriate psychological testing.  The patient is in verbal understanding and agreement of recommendations and plan in his own words.   The patient does verbally contract for safety at today's encounter and is in verbal agreement with the safety/crisis plan. The patient reports/verbally agrees in his own words that he will reach out to this APRN/office prior to next scheduled appointment if there is any worsening of mood, any new psychiatric symptoms, any medication side effects or  concerns, any concern for safety to self or others, any suicidal or homicidal ideations plans or intent, or any concerns, or he will call 911, call or text the suicide and crisis lifeline at [phone], or go to the closest emergency department.      PHQ-9 Depression Screening  Little interest or pleasure in doing things? (Patient-Rptd) Not at all   Feeling down, depressed, or hopeless? (Patient-Rptd) Not at all   PHQ-2 Total Score (Patient-Rptd) 0   Trouble falling or staying asleep, or sleeping too much? (Patient-Rptd) Almost all   Feeling tired or having little energy? (Patient-Rptd) Over half   Poor appetite or overeating? (Patient-Rptd) Not at all   Feeling bad about yourself - or that you are a failure or have let yourself or your family down? (Patient-Rptd) Several days   Trouble concentrating on things, such as reading the newspaper or watching television? (Patient-Rptd) Almost all   Moving or speaking so slowly that other people could have noticed? Or the opposite - being so fidgety or restless that you have been moving around a lot more than usual? (Patient-Rptd) Not at all   Thoughts that you would be better off dead, or of hurting yourself in some way? (Patient-Rptd) Not at all   PHQ-9 Total Score (Patient-Rptd) 9   If you checked off any problems, how difficult have these problems made it for you to do your work, take care of things at home, or get along with other people? (Patient-Rptd) Somewhat difficult       PHQ-9 Total Score: (Patient-Rptd) 9      ROBERT-7  Feeling nervous, anxious or on edge: (Patient-Rptd) Not at all  Not being able to stop or control worrying: (Patient-Rptd) Nearly every day  Worrying too much about different things: (Patient-Rptd) More than half the days  Trouble Relaxing: (Patient-Rptd) Nearly every day  Being so restless that it is hard to sit still: (Patient-Rptd) Several days  Feeling afraid as if something awful might happen: (Patient-Rptd) Not at all  Becoming easily annoyed or  irritable: (Patient-Rptd) Several days  ROBERT 7 Total Score: (Patient-Rptd) 10  If you checked any problems, how difficult have these problems made it for you to do your work, take care of things at home, or get along with other people: (Patient-Rptd) Somewhat difficult      Current Psychiatric Medications:  None currently.    All Known Prior Psychiatric Medications and Responses if Known:  -Methylphenidate 27 mg by mouth once daily in the morning - patient reports medication previously effective without side effects or concerns  -Adderall - the patient reports Adderall caused him to have a pulsatile sensation in his neck, and he feels that Adderall caused fluctuations in his blood pressure so he was changed to methylphenidate/Concerta, and found methylphenidate effective without any side effects or concerns    Currently in Counseling or Therapy:  The patient denies any, and declines the need for psychotherapy at this time.    Prior Psychiatric Outpatient Care:  The patient denies any other than through his primary care providers.    Prior Psychiatric Hospitalizations:  The patient denies any.    Previous Suicide Attempts:  The patient denies any.    Previous Self-Harming Behavior:  The patient denies any.    Any family history of suicide attempts:  The patient reports his son has attempted/considered attempting suicide in the past, none other known.    Legal History, Arrests, or Incarcerations:  The patient denies any.    Violent Tendencies:  The patient denies any.    Developmental History:  -The patient reports he was the result of a full-term pregnancy.  -The patient reports he met all of his developmental milestones as expected.  -The patient denies any knowledge of the biological mother using nicotine, alcohol or illicit/recreational substances during the pregnancy with the patient.    History of Seizures or Epilepsy and history of Concussions/brain injury/TBI:  The patient denies any.    Highest Level of  Education:  Two years of college.    Employment:  .     History:  The patient denies any.    Social History:  -Born: [place], reports he has been living in [place] the past 8 years  -Marriage status:   -Children: The patient reports 2 biological children, 1 son and 1 daughter  -Lives with: The patient's currently household consists of the patient, his spouse, and his 2 children.  The patient reports this is a safe environment for him, and denies any safety concerns at today's encounter.    Substance Use History:  -Smoking/cigarettes: Denies any, past or present  -Smokeless tobacco use: Denies any, past or present  -Electronic cigarette/vape use: Denies any, past or present  -Alcohol use: The patient reports occasional alcohol use, approximately 6 standard drinks of alcohol/6 glasses of wine per week.  He denies any history of alcohol addiction or abuse, or any history of alcohol withdrawal symptoms.  -Prescription drug misuse/abuse: Denies any, past or present  -Recreational or illicit substance use/misuse/abuse: Denies any, past or present  -Other: The patient reports he does use OTC CBD to assist with sleeping difficulties    Abuse History:  The patient denies any.    Patient's Support Network Includes:  wife and some close friends          The following portions of the patient's history were reviewed and updated as appropriate: allergies, current medications, past family history, past medical history, past social history, past surgical history and problem list.          Past Medical History:  Past Medical History:   Diagnosis Date    ADHD (attention deficit hyperactivity disorder) 2016    first diagnosis    Bankart lesion of left shoulder     Colon polyp     Dislocation of left shoulder joint     Kidney stone     Status post labral repair of shoulder     Tear of left glenoid labrum        Social History:  Social History     Socioeconomic History    Marital status:     Tobacco Use    Smoking status: Never     Passive exposure: Never    Smokeless tobacco: Never   Vaping Use    Vaping status: Never Used   Substance and Sexual Activity    Alcohol use: Yes     Alcohol/week: 6.0 standard drinks of alcohol     Types: 6 Glasses of wine per week    Drug use: Yes     Comment: OTC CBD to assist with sleep    Sexual activity: Yes     Partners: Female     Birth control/protection: Birth control pill       Family History:  Family History   Problem Relation Age of Onset    Diabetes Father     ADD / ADHD Brother     Cancer Brother 45        Colon Cancer    Cancer Maternal Grandfather         Colon Cancer    Stroke Maternal Grandfather     Diabetes Paternal Grandfather     Stroke Paternal Grandmother     ADD / ADHD Child     ADD / ADHD Child        Past Surgical History:  Past Surgical History:   Procedure Laterality Date    COLONOSCOPY  oct 2024    FRACTURE SURGERY      shoulder surgery    SHOULDER ARTHROSCOPY Left 02/26/2019    Left shoulder arthroscopy with extensive labral repair including capsulorrhaphy, reduction and fixation glenoid fracture, SLAP repair as well as chondroplasty         Problem List:  Patient Active Problem List   Diagnosis    Status post labral repair of shoulder, left    Adult ADHD    Hyperlipidemia    Mild intermittent asthma without complication    Family history of colon cancer in brother, age 45    Right supraspinatus tendinitis    Chronic pain of right knee    Multiple adenomatous polyps    Meralgia paraesthetica, right    Chronic pain of both shoulders       Allergy:   No Known Allergies     Current Medications:   No current outpatient medications on file.     No current facility-administered medications for this visit.           Review of Symptoms:    Review of Systems   Psychiatric/Behavioral:  Positive for decreased concentration, sleep disturbance and stress. Negative for agitation, dysphoric mood, hallucinations, self-injury, suicidal ideas and depressed  mood.            Physical Exam:   There were no vitals taken for this visit. There is no height or weight on file to calculate BMI.   Due to the remote nature of this encounter (virtual encounter), vitals were unable to be obtained.  Height stated at 76 inches.  Weight stated at around 210 pounds.        Physical Exam  Constitutional:       General: He is not in acute distress.  Neurological:      Mental Status: He is alert and oriented to person, place, and time.   Psychiatric:         Attention and Perception: Attention normal.         Mood and Affect: Mood and affect normal.         Speech: Speech normal.         Behavior: Behavior normal. Behavior is cooperative.         Thought Content: Thought content is not paranoid or delusional. Thought content does not include homicidal or suicidal ideation. Thought content does not include homicidal or suicidal plan.         Cognition and Memory: Cognition and memory normal.         Judgment: Judgment normal.           Mental Status Exam:   Hygiene:   good  Cooperation:  Cooperative  Eye Contact:  Good  Psychomotor Behavior:  Appropriate  Affect:  Appropriate  Mood: normal  Hopelessness: Denies  Speech:  Normal  Thought Process:  Goal directed and Linear  Thought Content:  Mood congruent  Suicidal:  None  Homicidal:  None  Hallucinations:  None and Not demonstrated today  Delusion:  None  Memory:  Intact  Orientation:  Person, Place, Time, and Situation  Reliability:  good  Insight:  Good  Judgement:  Good  Impulse Control:  Good  Physical/Medical Issues:  No            [facility] request #[id number], ran on 4/28/2025, reviewed at today's encounter.      PDMP reviewed by this APRN at today's encounter, 04/28/2025.      Previous available Provider notes and records reviewed by this APRN at today's encounter.           Wt Readings from Last 3 Encounters:   03/28/25 96.2 kg (212 lb)   03/01/24 98.4 kg (217 lb)   02/24/23 97.3 kg (214 lb 9.6 oz)     Temp Readings from Last 3  Encounters:   03/28/25 97 °F (36.1 °C) (Infrared)   03/01/24 97.4 °F (36.3 °C) (Infrared)   02/24/23 96.8 °F (36 °C) (Infrared)     BP Readings from Last 3 Encounters:   03/28/25 114/74   03/01/24 114/74   02/24/23 112/74     Pulse Readings from Last 3 Encounters:   03/28/25 79   03/01/24 61   02/24/23 68      BMI Readings from Last 3 Encounters:   03/28/25 25.81 kg/m²   03/01/24 26.41 kg/m²   02/24/23 26.12 kg/m²          Lab Results:   Results Encounter on 04/02/2025   Component Date Value Ref Range Status    Apolipoprotein B 04/11/2025 130 (H)  <90 mg/dL Final    Comment:                          Desirable               < 90                           Borderline High     90 -  99                           High               100 - 130                           Very High               >130       --------------------------------------------------            ASCVD RISK              THERAPEUTIC TARGET             CATEGORY                  APO B (mg/dL)          Very High Risk        <80 (if extreme risk <70)          High Risk             <90          Moderate Risk         <90      Total Cholesterol 04/11/2025 247 (H)  100 - 199 mg/dL Final    Triglycerides 04/11/2025 95  0 - 149 mg/dL Final    HDL Cholesterol 04/11/2025 53  >39 mg/dL Final    VLDL Cholesterol Oumar 04/11/2025 17  5 - 40 mg/dL Final    LDL Chol Calc (NIH) 04/11/2025 177 (H)  0 - 99 mg/dL Final    Lipoprotein (a) 04/11/2025 340.5 (H)  <75.0 nmol/L Final    Comment: Results confirmed on  dilution.  Note:  Values greater than or equal to 75.0 nmol/L may         indicate an independent risk factor for CHD,         but must be evaluated with caution when applied         to non- populations due to the         influence of genetic factors on Lp(a) across         ethnicities.      Hemoglobin A1C 04/11/2025 5.4  4.8 - 5.6 % Final    Comment:          Prediabetes: 5.7 - 6.4           Diabetes: >6.4           Glycemic control for adults with diabetes:  <7.0      WBC 04/11/2025 6.0  3.4 - 10.8 x10E3/uL Final    RBC 04/11/2025 4.80  4.14 - 5.80 x10E6/uL Final    Hemoglobin 04/11/2025 14.8  13.0 - 17.7 g/dL Final    Hematocrit 04/11/2025 44.6  37.5 - 51.0 % Final    MCV 04/11/2025 93  79 - 97 fL Final    MCH 04/11/2025 30.8  26.6 - 33.0 pg Final    MCHC 04/11/2025 33.2  31.5 - 35.7 g/dL Final    RDW 04/11/2025 12.1  11.6 - 15.4 % Final    Platelets 04/11/2025 221  150 - 450 x10E3/uL Final    Neutrophil Rel % 04/11/2025 54  Not Estab. % Final    Lymphocyte Rel % 04/11/2025 26  Not Estab. % Final    Monocyte Rel % 04/11/2025 15  Not Estab. % Final    Eosinophil Rel % 04/11/2025 4  Not Estab. % Final    Basophil Rel % 04/11/2025 1  Not Estab. % Final    Neutrophils Absolute 04/11/2025 3.3  1.4 - 7.0 x10E3/uL Final    Lymphocytes Absolute 04/11/2025 1.5  0.7 - 3.1 x10E3/uL Final    Monocytes Absolute 04/11/2025 0.9  0.1 - 0.9 x10E3/uL Final    Eosinophils Absolute 04/11/2025 0.3  0.0 - 0.4 x10E3/uL Final    Basophils Absolute 04/11/2025 0.0  0.0 - 0.2 x10E3/uL Final    Immature Granulocyte Rel % 04/11/2025 0  Not Estab. % Final    Immature Grans Absolute 04/11/2025 0.0  0.0 - 0.1 x10E3/uL Final    Glucose 04/11/2025 90  70 - 99 mg/dL Final    BUN 04/11/2025 19  6 - 24 mg/dL Final    Creatinine 04/11/2025 1.09  0.76 - 1.27 mg/dL Final    EGFR Result 04/11/2025 82  >59 mL/min/1.73 Final    BUN/Creatinine Ratio 04/11/2025 17  9 - 20 Final    Sodium 04/11/2025 143  134 - 144 mmol/L Final    Potassium 04/11/2025 4.8  3.5 - 5.2 mmol/L Final    Chloride 04/11/2025 105  96 - 106 mmol/L Final    Total CO2 04/11/2025 25  20 - 29 mmol/L Final    Calcium 04/11/2025 9.6  8.7 - 10.2 mg/dL Final    Total Protein 04/11/2025 6.7  6.0 - 8.5 g/dL Final    Albumin 04/11/2025 4.6  3.8 - 4.9 g/dL Final    Globulin 04/11/2025 2.1  1.5 - 4.5 g/dL Final    Total Bilirubin 04/11/2025 0.4  0.0 - 1.2 mg/dL Final    Alkaline Phosphatase 04/11/2025 50  44 - 121 IU/L Final    AST (SGOT) 04/11/2025 21   0 - 40 IU/L Final    ALT (SGPT) 04/11/2025 33  0 - 44 IU/L Final    PSA 04/11/2025 0.4  0.0 - 4.0 ng/mL Final    Comment: Roche ECLIA methodology.  According to the American Urological Association, Serum PSA should  decrease and remain at undetectable levels after radical  prostatectomy. The AUA defines biochemical recurrence as an initial  PSA value 0.2 ng/mL or greater followed by a subsequent confirmatory  PSA value 0.2 ng/mL or greater.  Values obtained with different assay methods or kits cannot be used  interchangeably. Results cannot be interpreted as absolute evidence  of the presence or absence of malignant disease.      TSH 04/11/2025 2.260  0.450 - 4.500 uIU/mL Final   Lab Requisition on 10/25/2024   Component Date Value Ref Range Status    Case Report 10/25/2024    Final                    Value:Surgical Pathology Report                         Case: [id number]                                  Authorizing Provider:  Skip Hanson MD    Collected:           10/25/2024 07:51 AM          Ordering Location:     [facility]  Received:            10/25/2024 01:23 PM                                 [facility]                                                               Pathologist:           Óscar Dawn MD                                                         Specimen:    Large Intestine, Right / Ascending Colon, ascending polyp                                  Final Diagnosis 10/25/2024    Final                    Value:1.  Colon, right ascending, biopsy: Sessile serrated lesion      Gross Description 10/25/2024    Final                    Value:1. Large Intestine, Right / Ascending Colon.  The specimen is received in formalin labeled with the patient's name and further designated 'ascending colon biopsy' are multiple small fragments of gray-tan tissue. The specimen is submitted for embedding as received.        Microscopic Description 10/25/2024    Final                     "Value:Unless \"gross only\" is specified, the final diagnosis for each specimen is based on microscopic examination of tissue.             Assessment & Plan   Problems Addressed this Visit    None  Visit Diagnoses         Impaired concentration  (Chronic)   -  Primary    R/O ADHD    Relevant Orders    Ambulatory Referral to Behavioral Health          Diagnoses         Codes Comments      Impaired concentration    -  Primary ICD-10-CM: R41.840  ICD-9-CM: 799.51 R/O ADHD            Visit Diagnoses:    ICD-10-CM ICD-9-CM   1. Impaired concentration  R41.840 799.51           GOALS:  Short Term Goals: Patient will be compliant with medication, and patient will have no significant medication related side effects.  Patient will be engaged in psychotherapy as indicated.  Patient will report subjective improvement of symptoms.  Long term goals: To stabilize mood and treat/improve subjective symptoms, the patient will stay out of the hospital, the patient will be at an optimal level of functioning, and the patient will take all medications as prescribed.  The patient verbalized understanding and agreement with goals that were mutually set.      TREATMENT PLAN: Take medications as indicated/prescribed.  Medication and treatment options, both pharmacological and non-pharmacological treatment options, discussed during today's visit, including any off label use of medication. Patient acknowledged and verbally consented with current treatment plan and was educated on the importance of compliance with treatment and follow-up appointments.      -Patient to upload results of psychological testing, or undergo psychological testing for ADHD concerns.  The patient reports he would be interested in resuming methylphenidate/Concerta after results of psychological testing, and if appropriate.  Patient to return to this APRN/office after obtaining results of psychological testing for further discussion and review.  Patient verbalized " understanding and agreement with plan in his own words.  -Referral for psychological testing has been placed today's encounter with the patient's verbal consent, but if he finds recent previous psychological testing he will cancel that appointment.  -Random urine drug screen and EKG for safety monitoring to be completed after results of psychological testing, and prior to beginning/restarting methylphenidate/Concerta.      MEDICATION ISSUES:  No medications prescribed at today's encounter, patient to complete confirmatory psychological testing, EKG, and urine drug screen before resuming methylphenidate.  Patient verbalized understanding and agreement with plan in his own words.    Due to the nature of virtual visits and inability to monitor vital signs and weight with virtual visits, the patient has been encouraged to monitor their vital signs and weight regularly either through self-monitoring via home device(s) or with their Primary Care Provider, and the patient has been instructed to notify this APRN of any abnormalities or changes from baseline.    Patient aware of limitations of provider's availability and office hours, and how to reach provider/office if needed (office number for patient to call for any questions/concerns: [phone]). If the patient's needs require more frequent or intensive management/monitoring than can be provided from this provider utilizing a strictly virtual platform, or care that is outside of this provider's scope, then patient may be referred to more appropriate provider or modality.        SUICIDE RISK ASSESSMENT AND SAFETY PLAN: Unalterable demographics and a history of mental health intervention indicate this patient is in a high risk category compared to the general population. At present, the patient denies active SI/HI, intentions, or plans at this time and agrees to seek immediate care should such thoughts develop. The patient verbalizes understanding of how to access  emergency care if needed and agrees to do so. Consideration of suicide risk and protective factors such as history, current presentation, individual strengths and weaknesses, psychosocial and environmental stressors and variables, psychiatric illness and symptoms, medical conditions and pain, took place in this interview. Based on those considerations, the patient is determined: within individual baseline and presenting no imminent risk for suicide or homicide. Other recommendations: The patient does not meet the criteria for inpatient admission and is not a safety risk to self or others at today's visit. Inpatient treatment offers no significant advantages over outpatient treatment for this patient at today's visit.  The patient was given ample time for questions and fully participated in treatment planning.  The patient was encouraged to call the clinic with any questions or concerns.  The patient was informed of access to emergency care. If patient were to develop any significant symptomatology, suicidal ideation, homicidal ideation, any concerns, or feel unsafe at any time they are to call the clinic and if unable to get immediate assistance should immediately call 911 or go to the nearest emergency room.  Patient contracted verbally for the following: If you are experiencing an emotional crisis or have thoughts of harming yourself or others, please go to your nearest local emergency room or call 911. Will continue to re-assess medication response and side effects frequently to establish efficacy and ensure safety. Risks, any black box warnings, side effects, off label usage, and benefits of medication and treatment discussed with patient, along with potential adverse side effects of current and/or newly prescribed medication, alternative treatment options, and OTC medications.  Patient verbalized understanding of potential risks, any off label use of medication, any black box warnings, and any side effects in  their own words. The patient verbalized understanding and agreed to comply with the safety plan discussed in their own words.  Patient given the number to the office. Number also discussed of the 24- hour suicide hotline.           MEDS ORDERED DURING VISIT:  No orders of the defined types were placed in this encounter.      Return for 4-8 weeks, after completing, or obtaining, results of psych/ADHD testing, or sooner if needed.           Progress toward goal: Not at goal    Functional Status: Mild impairment     Prognosis: Good with Ongoing Treatment             This document has been electronically signed by LIZA Huffman  April 28, 2025 18:09 EDT      Please note that portions of this note were completed with a voice recognition program.